# Patient Record
Sex: FEMALE | Race: BLACK OR AFRICAN AMERICAN | NOT HISPANIC OR LATINO | Employment: OTHER | URBAN - METROPOLITAN AREA
[De-identification: names, ages, dates, MRNs, and addresses within clinical notes are randomized per-mention and may not be internally consistent; named-entity substitution may affect disease eponyms.]

---

## 2023-10-22 ENCOUNTER — HOSPITAL ENCOUNTER (EMERGENCY)
Facility: HOSPITAL | Age: 83
Discharge: HOME | End: 2023-10-22
Attending: EMERGENCY MEDICINE
Payer: MEDICARE

## 2023-10-22 VITALS
HEART RATE: 72 BPM | RESPIRATION RATE: 18 BRPM | WEIGHT: 198 LBS | BODY MASS INDEX: 31.08 KG/M2 | DIASTOLIC BLOOD PRESSURE: 74 MMHG | HEIGHT: 67 IN | SYSTOLIC BLOOD PRESSURE: 160 MMHG | TEMPERATURE: 98.3 F | OXYGEN SATURATION: 98 %

## 2023-10-22 DIAGNOSIS — I10 HYPERTENSION: Primary | ICD-10-CM

## 2023-10-22 LAB
ANION GAP SERPL CALCULATED.3IONS-SCNC: 9 MMOL/L
ATRIAL RATE: 65 BPM
BASOPHILS # BLD AUTO: 0.05 THOUSANDS/ÂΜL (ref 0–0.1)
BASOPHILS NFR BLD AUTO: 1 % (ref 0–1)
BUN SERPL-MCNC: 19 MG/DL (ref 5–25)
CALCIUM SERPL-MCNC: 9.6 MG/DL (ref 8.4–10.2)
CHLORIDE SERPL-SCNC: 109 MMOL/L (ref 96–108)
CO2 SERPL-SCNC: 21 MMOL/L (ref 21–32)
CREAT SERPL-MCNC: 1.2 MG/DL (ref 0.6–1.3)
EOSINOPHIL # BLD AUTO: 0.18 THOUSAND/ÂΜL (ref 0–0.61)
EOSINOPHIL NFR BLD AUTO: 2 % (ref 0–6)
ERYTHROCYTE [DISTWIDTH] IN BLOOD BY AUTOMATED COUNT: 17.8 % (ref 11.6–15.1)
GFR SERPL CREATININE-BSD FRML MDRD: 41 ML/MIN/1.73SQ M
GLUCOSE SERPL-MCNC: 96 MG/DL (ref 65–140)
HCT VFR BLD AUTO: 39.2 % (ref 34.8–46.1)
HGB BLD-MCNC: 12.1 G/DL (ref 11.5–15.4)
IMM GRANULOCYTES # BLD AUTO: 0.02 THOUSAND/UL (ref 0–0.2)
IMM GRANULOCYTES NFR BLD AUTO: 0 % (ref 0–2)
LYMPHOCYTES # BLD AUTO: 2.01 THOUSANDS/ÂΜL (ref 0.6–4.47)
LYMPHOCYTES NFR BLD AUTO: 27 % (ref 14–44)
MCH RBC QN AUTO: 25.5 PG (ref 26.8–34.3)
MCHC RBC AUTO-ENTMCNC: 30.9 G/DL (ref 31.4–37.4)
MCV RBC AUTO: 83 FL (ref 82–98)
MONOCYTES # BLD AUTO: 0.51 THOUSAND/ÂΜL (ref 0.17–1.22)
MONOCYTES NFR BLD AUTO: 7 % (ref 4–12)
NEUTROPHILS # BLD AUTO: 4.71 THOUSANDS/ÂΜL (ref 1.85–7.62)
NEUTS SEG NFR BLD AUTO: 63 % (ref 43–75)
NRBC BLD AUTO-RTO: 0 /100 WBCS
P AXIS: 56 DEGREES
PLATELET # BLD AUTO: 171 THOUSANDS/UL (ref 149–390)
PMV BLD AUTO: 12.6 FL (ref 8.9–12.7)
POTASSIUM SERPL-SCNC: 3.9 MMOL/L (ref 3.5–5.3)
PR INTERVAL: 282 MS
QRS AXIS: 264 DEGREES
QRSD INTERVAL: 148 MS
QT INTERVAL: 484 MS
QTC INTERVAL: 503 MS
RBC # BLD AUTO: 4.74 MILLION/UL (ref 3.81–5.12)
SODIUM SERPL-SCNC: 139 MMOL/L (ref 135–147)
T WAVE AXIS: 72 DEGREES
VENTRICULAR RATE: 65 BPM
WBC # BLD AUTO: 7.48 THOUSAND/UL (ref 4.31–10.16)

## 2023-10-22 PROCEDURE — 99284 EMERGENCY DEPT VISIT MOD MDM: CPT | Performed by: EMERGENCY MEDICINE

## 2023-10-22 PROCEDURE — 96376 TX/PRO/DX INJ SAME DRUG ADON: CPT

## 2023-10-22 PROCEDURE — 93005 ELECTROCARDIOGRAM TRACING: CPT

## 2023-10-22 PROCEDURE — 93010 ELECTROCARDIOGRAM REPORT: CPT | Performed by: INTERNAL MEDICINE

## 2023-10-22 PROCEDURE — 99284 EMERGENCY DEPT VISIT MOD MDM: CPT

## 2023-10-22 PROCEDURE — 96374 THER/PROPH/DIAG INJ IV PUSH: CPT

## 2023-10-22 PROCEDURE — 85025 COMPLETE CBC W/AUTO DIFF WBC: CPT | Performed by: EMERGENCY MEDICINE

## 2023-10-22 PROCEDURE — 36415 COLL VENOUS BLD VENIPUNCTURE: CPT | Performed by: EMERGENCY MEDICINE

## 2023-10-22 PROCEDURE — 80048 BASIC METABOLIC PNL TOTAL CA: CPT | Performed by: EMERGENCY MEDICINE

## 2023-10-22 RX ORDER — HYDRALAZINE HYDROCHLORIDE 20 MG/ML
10 INJECTION INTRAMUSCULAR; INTRAVENOUS ONCE
Status: COMPLETED | OUTPATIENT
Start: 2023-10-22 | End: 2023-10-22

## 2023-10-22 RX ORDER — HYDRALAZINE HYDROCHLORIDE 20 MG/ML
5 INJECTION INTRAMUSCULAR; INTRAVENOUS ONCE
Status: COMPLETED | OUTPATIENT
Start: 2023-10-22 | End: 2023-10-22

## 2023-10-22 RX ADMIN — HYDRALAZINE HYDROCHLORIDE 10 MG: 20 INJECTION INTRAMUSCULAR; INTRAVENOUS at 03:46

## 2023-10-22 RX ADMIN — HYDRALAZINE HYDROCHLORIDE 5 MG: 20 INJECTION INTRAMUSCULAR; INTRAVENOUS at 05:27

## 2023-10-22 NOTE — ED PROVIDER NOTES
History  Chief Complaint   Patient presents with    Hypertension     Pt arrives via hospital wheelchair with a c/o HTN (213/98). Pt takes Labetalol, and states that she takes it as prescribed. Denies CP, SOB or HA. 79-year-old female history of hypertension, CHF, CAD, DVT on Xarelto presenting with asymptomatic hypertension. Patient reports that she takes her blood pressure daily and is normally well controlled. Patient reports elevated blood pressure at home today. Denies any symptoms including headache, visual changes, chest pain, shortness of breath. Also denies any neurological changes such as motor or sensory deficits. Denies any other complaints. Reports compliance with home medication. Chart reviewed. Past Medical History:  No date: Anemia  No date: Cardiac disease  No date: Cervical spondylolysis  No date: Cervical stenosis of spine  No date: CHF (congestive heart failure) ([facility])  No date: DVT (deep venous thrombosis) ([facility])  No date: Hypertension  No date: Neuropathy      Comment:  from fall accident  No date: Osteoarthritis  Family History: non-contributory  Social History          Prior to Admission Medications   Prescriptions Last Dose Informant Patient Reported? Taking?    Calcium Carbonate-Vit D-Min (CALCIUM 1200 PO)   Yes No   Sig: Take by mouth   amLODIPine-atorvastatin (CADUET) 10-40 MG per tablet   Yes No   Sig: Take 1 tablet by mouth daily   ferrous sulfate 324 (65 Fe) mg   No No   Sig: Take 1 tablet (324 mg total) by mouth 2 (two) times a day before meals   gabapentin (NEURONTIN) 100 mg capsule   No No   Sig: Take 1 capsule (100 mg total) by mouth daily   labetalol (NORMODYNE) 200 mg tablet   No No   Sig: Take 2 tablets (400 mg total) by mouth every 12 (twelve) hours Take 400 mg in the morning and 200 at night   Patient taking differently: Take 100 mg by mouth every 12 (twelve) hours Take 400 mg in the morning and 200 at night   rivaroxaban (XARELTO) 20 mg tablet   No No   Sig: Take 1 tablet (20 mg total) by mouth daily with breakfast      Facility-Administered Medications: None       Past Medical History:   Diagnosis Date    Anemia     Cardiac disease     Cervical spondylolysis     Cervical stenosis of spine     CHF (congestive heart failure) (HCC)     DVT (deep venous thrombosis) (HCC)     Hypertension     Neuropathy     from fall accident    Osteoarthritis        Past Surgical History:   Procedure Laterality Date    CARDIAC PACEMAKER PLACEMENT         History reviewed. No pertinent family history. I have reviewed and agree with the history as documented. E-Cigarette/Vaping    E-Cigarette Use Never User      E-Cigarette/Vaping Substances    Nicotine No     THC No     CBD No     Flavoring No     Other No     Unknown No      Social History     Tobacco Use    Smoking status: Never    Smokeless tobacco: Never   Vaping Use    Vaping Use: Never used   Substance Use Topics    Alcohol use: No    Drug use: No       Review of Systems   Constitutional:  Negative for appetite change, chills, diaphoresis, fever and unexpected weight change. HENT:  Negative for congestion and rhinorrhea. Eyes:  Negative for photophobia and visual disturbance. Respiratory:  Negative for cough, chest tightness and shortness of breath. Cardiovascular:  Negative for chest pain, palpitations and leg swelling. Gastrointestinal:  Negative for abdominal distention, abdominal pain, blood in stool, constipation, diarrhea, nausea and vomiting. Genitourinary:  Negative for dysuria and hematuria. Musculoskeletal:  Negative for back pain, joint swelling, neck pain and neck stiffness. Skin:  Negative for color change, pallor, rash and wound. Neurological:  Negative for dizziness, syncope, weakness, light-headedness and headaches. Psychiatric/Behavioral:  Negative for agitation. All other systems reviewed and are negative. Physical Exam  Physical Exam  Vitals and nursing note reviewed.    Constitutional: General: She is not in acute distress. Appearance: Normal appearance. She is well-developed. She is not ill-appearing, toxic-appearing or diaphoretic. HENT:      Head: Normocephalic and atraumatic. Nose: Nose normal. No congestion or rhinorrhea. Mouth/Throat:      Mouth: Mucous membranes are moist.      Pharynx: Oropharynx is clear. No oropharyngeal exudate or posterior oropharyngeal erythema. Eyes:      General: No scleral icterus. Right eye: No discharge. Left eye: No discharge. Extraocular Movements: Extraocular movements intact. Conjunctiva/sclera: Conjunctivae normal.      Pupils: Pupils are equal, round, and reactive to light. Neck:      Vascular: No JVD. Trachea: No tracheal deviation. Comments: Supple. Normal range of motion. Cardiovascular:      Rate and Rhythm: Normal rate and regular rhythm. Heart sounds: Normal heart sounds. No murmur heard. No friction rub. No gallop. Comments: Normal rate and regular rhythm  Pulmonary:      Effort: Pulmonary effort is normal. No respiratory distress. Breath sounds: Normal breath sounds. No stridor. No wheezing or rales. Comments: Clear to auscultation bilaterally  Chest:      Chest wall: No tenderness. Abdominal:      General: Bowel sounds are normal. There is no distension. Palpations: Abdomen is soft. Tenderness: There is no abdominal tenderness. There is no right CVA tenderness, left CVA tenderness, guarding or rebound. Comments: Soft, nontender, nondistended. Normal bowel sounds throughout   Musculoskeletal:         General: No swelling, tenderness, deformity or signs of injury. Normal range of motion. Cervical back: Normal range of motion and neck supple. No rigidity. No muscular tenderness. Right lower leg: No edema. Left lower leg: No edema. Lymphadenopathy:      Cervical: No cervical adenopathy. Skin:     General: Skin is warm and dry. Coloration: Skin is not pale. Findings: No erythema or rash. Neurological:      General: No focal deficit present. Mental Status: She is alert. Mental status is at baseline. Sensory: No sensory deficit. Motor: No weakness or abnormal muscle tone. Coordination: Coordination normal.      Gait: Gait normal.      Comments: Alert. Strength and sensation grossly intact. Ambulatory without difficulty at baseline. Psychiatric:         Behavior: Behavior normal.         Thought Content:  Thought content normal.         Vital Signs  ED Triage Vitals [10/22/23 0013]   Temperature Pulse Respirations Blood Pressure SpO2   98.3 °F (36.8 °C) 64 18 (!) 219/91 95 %      Temp Source Heart Rate Source Patient Position - Orthostatic VS BP Location FiO2 (%)   Oral Monitor Sitting Left arm --      Pain Score       --           Vitals:    10/22/23 0013 10/22/23 0430   BP: (!) 219/91 (!) 174/79   Pulse: 64 68   Patient Position - Orthostatic VS: Sitting          Visual Acuity      ED Medications  Medications   hydrALAZINE (APRESOLINE) injection 10 mg (10 mg Intravenous Given 10/22/23 0346)   hydrALAZINE (APRESOLINE) injection 5 mg (5 mg Intravenous Given 10/22/23 0527)       Diagnostic Studies  Results Reviewed       Procedure Component Value Units Date/Time    Basic metabolic panel [[id number]]  (Abnormal) Collected: 10/22/23 0346    Lab Status: Final result Specimen: Blood from Hand, Right Updated: 10/22/23 0408     Sodium 139 mmol/L      Potassium 3.9 mmol/L      Chloride 109 mmol/L      CO2 21 mmol/L      ANION GAP 9 mmol/L      BUN 19 mg/dL      Creatinine 1.20 mg/dL      Glucose 96 mg/dL      Calcium 9.6 mg/dL      eGFR 41 ml/min/1.73sq m     Narrative:      [place] guidelines for Chronic Kidney Disease (CKD):     Stage 1 with normal or high GFR (GFR > 90 mL/min/1.73 square meters)    Stage 2 Mild CKD (GFR = 60-89 mL/min/1.73 square meters)    Stage 3A Moderate CKD (GFR = 45-59 mL/min/1.73 square meters)    Stage 3B Moderate CKD (GFR = 30-44 mL/min/1.73 square meters)    Stage 4 Severe CKD (GFR = 15-29 mL/min/1.73 square meters)    Stage 5 End Stage CKD (GFR <15 mL/min/1.73 square meters)  Note: GFR calculation is accurate only with a steady state creatinine    CBC and differential [[id number]]  (Abnormal) Collected: 10/22/23 0346    Lab Status: Final result Specimen: Blood from Hand, Right Updated: 10/22/23 0350     WBC 7.48 Thousand/uL      RBC 4.74 Million/uL      Hemoglobin 12.1 g/dL      Hematocrit 39.2 %      MCV 83 fL      MCH 25.5 pg      MCHC 30.9 g/dL      RDW 17.8 %      MPV 12.6 fL      Platelets 538 Thousands/uL      nRBC 0 /100 WBCs      Neutrophils Relative 63 %      Immat GRANS % 0 %      Lymphocytes Relative 27 %      Monocytes Relative 7 %      Eosinophils Relative 2 %      Basophils Relative 1 %      Neutrophils Absolute 4.71 Thousands/µL      Immature Grans Absolute 0.02 Thousand/uL      Lymphocytes Absolute 2.01 Thousands/µL      Monocytes Absolute 0.51 Thousand/µL      Eosinophils Absolute 0.18 Thousand/µL      Basophils Absolute 0.05 Thousands/µL                    No orders to display              Procedures  Procedures         ED Course                                             Medical Decision Making  24-year-old female history of hypertension, CHF, CAD, DVT on Xarelto presenting with asymptomatic hypertension. Plan for EKG and basic labs. Will give dose of IV blood pressure medication. Reassess. EKG interpreted by me with normal sinus rhythm and no acute ST abnormality. Labs interpreted by me without significant acute process. Blood pressure improving with medications. Blood pressure medication recommendations. Discussed results and recommendations. Advised follow up PCP. Medication recommendations. Given instructions and return precautions.  Patient/family at bedside acknowledged understanding of all written and verbal instructions and return precautions. Discharged. Amount and/or Complexity of Data Reviewed  Labs: ordered. Risk  Prescription drug management. Disposition  Final diagnoses:   Hypertension     Time reflects when diagnosis was documented in both MDM as applicable and the Disposition within this note       Time User Action Codes Description Comment    10/22/2023  3:52 AM KevinRachel dunham [facility] Hypertension           ED Disposition       ED Disposition   Discharge    Condition   Stable    Date/Time   Sun Oct 22, 2023  4:43 AM    Comment   Jeanne Suh discharge to home/self care. Follow-up Information       Follow up With Specialties Details Why [address] Manzano MD Family Medicine Schedule an appointment as soon as possible for a visit in 1 week  [facility]. [address]  [facility] [address] [phone]              Patient's Medications   Discharge Prescriptions    No medications on file       No discharge procedures on file.     PDMP Review       None            ED Provider  Electronically Signed by             Maggie Pickering MD  10/22/23 1553

## 2025-05-09 ENCOUNTER — APPOINTMENT (INPATIENT)
Dept: RADIOLOGY | Facility: HOSPITAL | Age: 85
DRG: 392 | End: 2025-05-09
Payer: COMMERCIAL

## 2025-05-09 ENCOUNTER — HOSPITAL ENCOUNTER (INPATIENT)
Facility: HOSPITAL | Age: 85
LOS: 1 days | Discharge: HOME | DRG: 392 | End: 2025-05-10
Attending: EMERGENCY MEDICINE | Admitting: STUDENT IN AN ORGANIZED HEALTH CARE EDUCATION/TRAINING PROGRAM
Payer: COMMERCIAL

## 2025-05-09 DIAGNOSIS — R13.10 DIFFICULTY SWALLOWING: ICD-10-CM

## 2025-05-09 DIAGNOSIS — I10 HYPERTENSION: ICD-10-CM

## 2025-05-09 DIAGNOSIS — I15.9 SECONDARY HYPERTENSION: ICD-10-CM

## 2025-05-09 DIAGNOSIS — R11.10 VOMITING: Primary | ICD-10-CM

## 2025-05-09 LAB
ANION GAP SERPL CALCULATED.3IONS-SCNC: 8 MMOL/L (ref 4–13)
BASOPHILS # BLD AUTO: 0.01 THOUSANDS/ÂΜL (ref 0–0.1)
BASOPHILS NFR BLD AUTO: 0 % (ref 0–1)
BUN SERPL-MCNC: 13 MG/DL (ref 5–25)
CALCIUM SERPL-MCNC: 9.4 MG/DL (ref 8.4–10.2)
CHLORIDE SERPL-SCNC: 107 MMOL/L (ref 96–108)
CO2 SERPL-SCNC: 27 MMOL/L (ref 21–32)
CREAT SERPL-MCNC: 0.98 MG/DL (ref 0.6–1.3)
EOSINOPHIL # BLD AUTO: 0.14 THOUSAND/ÂΜL (ref 0–0.61)
EOSINOPHIL NFR BLD AUTO: 1 % (ref 0–6)
ERYTHROCYTE [DISTWIDTH] IN BLOOD BY AUTOMATED COUNT: 18.1 % (ref 11.6–15.1)
GFR SERPL CREATININE-BSD FRML MDRD: 52 ML/MIN/1.73SQ M
GLUCOSE SERPL-MCNC: 139 MG/DL (ref 65–140)
HCT VFR BLD AUTO: 31.8 % (ref 34.8–46.1)
HGB BLD-MCNC: 9.8 G/DL (ref 11.5–15.4)
IMM GRANULOCYTES # BLD AUTO: 0.05 THOUSAND/UL (ref 0–0.2)
IMM GRANULOCYTES NFR BLD AUTO: 0 % (ref 0–2)
LYMPHOCYTES # BLD AUTO: 1.16 THOUSANDS/ÂΜL (ref 0.6–4.47)
LYMPHOCYTES NFR BLD AUTO: 10 % (ref 14–44)
MCH RBC QN AUTO: 24.6 PG (ref 26.8–34.3)
MCHC RBC AUTO-ENTMCNC: 30.8 G/DL (ref 31.4–37.4)
MCV RBC AUTO: 80 FL (ref 82–98)
MONOCYTES # BLD AUTO: 0.35 THOUSAND/ÂΜL (ref 0.17–1.22)
MONOCYTES NFR BLD AUTO: 3 % (ref 4–12)
NEUTROPHILS # BLD AUTO: 10.44 THOUSANDS/ÂΜL (ref 1.85–7.62)
NEUTS SEG NFR BLD AUTO: 86 % (ref 43–75)
NRBC BLD AUTO-RTO: 0 /100 WBCS
PLATELET # BLD AUTO: 188 THOUSANDS/UL (ref 149–390)
PMV BLD AUTO: 10.6 FL (ref 8.9–12.7)
POTASSIUM SERPL-SCNC: 3.6 MMOL/L (ref 3.5–5.3)
RBC # BLD AUTO: 3.99 MILLION/UL (ref 3.81–5.12)
SODIUM SERPL-SCNC: 142 MMOL/L (ref 135–147)
WBC # BLD AUTO: 12.15 THOUSAND/UL (ref 4.31–10.16)

## 2025-05-09 PROCEDURE — 99223 1ST HOSP IP/OBS HIGH 75: CPT | Performed by: STUDENT IN AN ORGANIZED HEALTH CARE EDUCATION/TRAINING PROGRAM

## 2025-05-09 PROCEDURE — 96361 HYDRATE IV INFUSION ADD-ON: CPT

## 2025-05-09 PROCEDURE — 80048 BASIC METABOLIC PNL TOTAL CA: CPT | Performed by: EMERGENCY MEDICINE

## 2025-05-09 PROCEDURE — 82728 ASSAY OF FERRITIN: CPT | Performed by: STUDENT IN AN ORGANIZED HEALTH CARE EDUCATION/TRAINING PROGRAM

## 2025-05-09 PROCEDURE — 99284 EMERGENCY DEPT VISIT MOD MDM: CPT

## 2025-05-09 PROCEDURE — 83550 IRON BINDING TEST: CPT | Performed by: STUDENT IN AN ORGANIZED HEALTH CARE EDUCATION/TRAINING PROGRAM

## 2025-05-09 PROCEDURE — 96374 THER/PROPH/DIAG INJ IV PUSH: CPT

## 2025-05-09 PROCEDURE — 71045 X-RAY EXAM CHEST 1 VIEW: CPT

## 2025-05-09 PROCEDURE — 99285 EMERGENCY DEPT VISIT HI MDM: CPT | Performed by: EMERGENCY MEDICINE

## 2025-05-09 PROCEDURE — 85025 COMPLETE CBC W/AUTO DIFF WBC: CPT | Performed by: EMERGENCY MEDICINE

## 2025-05-09 PROCEDURE — 83540 ASSAY OF IRON: CPT | Performed by: STUDENT IN AN ORGANIZED HEALTH CARE EDUCATION/TRAINING PROGRAM

## 2025-05-09 PROCEDURE — 36415 COLL VENOUS BLD VENIPUNCTURE: CPT | Performed by: EMERGENCY MEDICINE

## 2025-05-09 RX ORDER — METOPROLOL TARTRATE 1 MG/ML
2.5 INJECTION, SOLUTION INTRAVENOUS EVERY 6 HOURS PRN
Status: DISCONTINUED | OUTPATIENT
Start: 2025-05-09 | End: 2025-05-10 | Stop reason: HOSPADM

## 2025-05-09 RX ORDER — AMLODIPINE BESYLATE 10 MG/1
10 TABLET ORAL DAILY
Status: DISCONTINUED | OUTPATIENT
Start: 2025-05-10 | End: 2025-05-10 | Stop reason: HOSPADM

## 2025-05-09 RX ORDER — ATORVASTATIN CALCIUM 40 MG/1
40 TABLET, FILM COATED ORAL DAILY
Status: DISCONTINUED | OUTPATIENT
Start: 2025-05-10 | End: 2025-05-10 | Stop reason: HOSPADM

## 2025-05-09 RX ORDER — LABETALOL 100 MG/1
100 TABLET, FILM COATED ORAL EVERY 12 HOURS SCHEDULED
Status: DISCONTINUED | OUTPATIENT
Start: 2025-05-09 | End: 2025-05-10 | Stop reason: HOSPADM

## 2025-05-09 RX ORDER — PANTOPRAZOLE SODIUM 40 MG/10ML
40 INJECTION, POWDER, LYOPHILIZED, FOR SOLUTION INTRAVENOUS ONCE
Status: COMPLETED | OUTPATIENT
Start: 2025-05-09 | End: 2025-05-09

## 2025-05-09 RX ADMIN — SODIUM CHLORIDE 1000 ML: 0.9 INJECTION, SOLUTION INTRAVENOUS at 18:05

## 2025-05-09 RX ADMIN — PANTOPRAZOLE SODIUM 40 MG: 40 INJECTION, POWDER, FOR SOLUTION INTRAVENOUS at 18:05

## 2025-05-09 RX ADMIN — LABETALOL HYDROCHLORIDE 100 MG: 100 TABLET, FILM COATED ORAL at 21:18

## 2025-05-09 NOTE — H&P
H&P - Hospitalist   Name: Fanta Bishop 84 y.o. female I MRN: [id number]  Unit/Bed#: -01 I Date of Admission: 5/9/2025   Date of Service: 5/9/2025 I Hospital Day: 0     Assessment & Plan  Difficulty swallowing  Sudden onset difficulty swallowing solids and liquids after having breakfast and pills, doesn't think anything got stuck in there  ED reached out to GI, plan for EGD tomm   Will get cxr check for foreign body   Clears for now if she tolerates   Ekg ordered   Neuropathy  Hold gabapentin ?sidefx of dysphagia  Hypertension  Missed pills this morning bc couldn't swallow, Cont home meds- will add iv prn if not tolerating swallow   Iron deficiency anemia  Cont home meds, pt does not want biopsy for risk of bleeding as she refuses blood products       VTE Pharmacologic Prophylaxis:   Moderate Risk (Score 3-4) - Pharmacological DVT Prophylaxis Ordered: rivaroxaban (Xarelto).  Code Status: Level 1 - Full Code       Anticipated Length of Stay: Patient will be admitted on an inpatient basis with an anticipated length of stay of greater than 2 midnights secondary to GI eval rashad garcia EGD.    History of Present Illness   Chief Complaint: difficulty swallowing    Fanta Bishop is a 84 y.o. female with a PMH of with sudden onset difficutly swallowing after having breakfast and trying to take her morning pills. She states she tried to drink water but it came right back up but thicker almost like saliva. Has not attempted to eat again after that. This has never happened before.     Review of Systems   All other systems reviewed and are negative.      Historical Information   Past Medical History:   Diagnosis Date    Anemia     Cardiac disease     Cervical spondylolysis     Cervical stenosis of spine     CHF (congestive heart failure) ([facility])     DVT (deep venous thrombosis) ([facility])     Hypertension     Neuropathy     from fall accident    Osteoarthritis      Past Surgical History:   Procedure Laterality Date    CARDIAC  "PACEMAKER PLACEMENT       Social History     Tobacco Use    Smoking status: Never    Smokeless tobacco: Never   Vaping Use    Vaping status: Never Used   Substance and Sexual Activity    Alcohol use: No    Drug use: No    Sexual activity: Not on file     E-Cigarette/Vaping    E-Cigarette Use Never User      E-Cigarette/Vaping Substances    Nicotine No     THC No     CBD No     Flavoring No     Other No     Unknown No        Social History:  Marital Status:        Meds/Allergies   I have reviewed home medications with patient personally.  Prior to Admission medications    Medication Sig Start Date End Date Taking? Authorizing Provider   amLODIPine-atorvastatin (CADUET) 10-40 MG per tablet Take 1 tablet by mouth daily    Historical Provider, MD   Calcium Carbonate-Vit D-Min (CALCIUM 1200 PO) Take by mouth    Historical Provider, MD   ferrous sulfate 324 (65 Fe) mg Take 1 tablet (324 mg total) by mouth 2 (two) times a day before meals 1/11/19   Andres Ortiz MD   gabapentin (NEURONTIN) 100 mg capsule Take 1 capsule (100 mg total) by mouth daily 1/11/19   Andres Ortiz MD   labetalol (NORMODYNE) 200 mg tablet Take 2 tablets (400 mg total) by mouth every 12 (twelve) hours Take 400 mg in the morning and 200 at night  Patient taking differently: Take 100 mg by mouth every 12 (twelve) hours Take 400 mg in the morning and 200 at night 1/11/19   Andres Ortiz MD   rivaroxaban (XARELTO) 20 mg tablet Take 1 tablet (20 mg total) by mouth daily with breakfast 1/11/19   Andres Ortiz MD     Allergies   Allergen Reactions    Codeine      \"made me pass out\"       Objective :  Temp:  [97.5 °F (36.4 °C)-98.4 °F (36.9 °C)] 97.5 °F (36.4 °C)  HR:  [70-78] 78  BP: (175-199)/(93-95) 175/93  Resp:  [17-18] 17  SpO2:  [91 %-98 %] 95 %  O2 Device: None (Room air)    Physical Exam  Constitutional:       Appearance: Normal appearance.   HENT:      Mouth/Throat:      Pharynx: Oropharynx is clear.   Cardiovascular:     "  Rate and Rhythm: Normal rate and regular rhythm.   Abdominal:      General: There is no distension.      Tenderness: There is no abdominal tenderness.   Skin:     General: Skin is warm and dry.   Neurological:      General: No focal deficit present.      Mental Status: She is alert.             Lab Results: I have reviewed the following results:  Results from last 7 days   Lab Units 05/09/25  1822   WBC Thousand/uL 12.15*   HEMOGLOBIN g/dL 9.8*   HEMATOCRIT % 31.8*   PLATELETS Thousands/uL 188   SEGS PCT % 86*   LYMPHO PCT % 10*   MONO PCT % 3*   EOS PCT % 1     Results from last 7 days   Lab Units 05/09/25  1822   SODIUM mmol/L 142   POTASSIUM mmol/L 3.6   CHLORIDE mmol/L 107   CO2 mmol/L 27   BUN mg/dL 13   CREATININE mg/dL 0.98   ANION GAP mmol/L 8   CALCIUM mg/dL 9.4   GLUCOSE RANDOM mg/dL 139             Lab Results   Component Value Date    HGBA1C 5.4 09/01/2020           Imaging Results Review: No pertinent imaging studies reviewed.  Other Study Results Review: No additional pertinent studies reviewed.    Administrative Statements       ** Please Note: This note has been constructed using a voice recognition system. **

## 2025-05-09 NOTE — ED PROVIDER NOTES
Time reflects when diagnosis was documented in both MDM as applicable and the Disposition within this note       Time User Action Codes Description Comment    5/9/2025  5:53 PM Hever Washington Add [R11.10] Vomiting           ED Disposition       ED Disposition   Admit    Condition   Stable    Date/Time   Fri May 9, 2025  7:13 PM    Comment   Case was discussed with Dr Lopez and the patient's admission status was agreed to be Admission Status: inpatient status to the service of Dr. Ortiz .               Assessment & Plan       Medical Decision Making  Problems Addressed:  Vomiting: acute illness or injury     Details: Ddx includes esophageal food bolus, esophagitis, electrolyte abnormality, etc.     Amount and/or Complexity of Data Reviewed  Labs: ordered.    Risk  Prescription drug management.  Decision regarding hospitalization.             Medications   glucagon (FOR EMS ONLY) (GLUCAGEN) injection 1 mg (0 mg Does not apply Given to EMS 5/9/25 3864)   sodium chloride 0.9 % bolus 1,000 mL (1,000 mL Intravenous New Bag 5/9/25 1805)   pantoprazole (PROTONIX) injection 40 mg (40 mg Intravenous Given 5/9/25 1805)       ED Risk Strat Scores                    No data recorded                            History of Present Illness       Chief Complaint   Patient presents with    Medical Problem     Pt arrives ems from home, states taking her pills this morning around 10 am and felt pills get stuck and has been feeling this ever since, has been drinking water with minimal relief, ems administered 1mg iv glucagon with minimal relief        Past Medical History:   Diagnosis Date    Anemia     Cardiac disease     Cervical spondylolysis     Cervical stenosis of spine     CHF (congestive heart failure) (HCC)     DVT (deep venous thrombosis) (HCC)     Hypertension     Neuropathy     from fall accident    Osteoarthritis       Past Surgical History:   Procedure Laterality Date    CARDIAC PACEMAKER PLACEMENT        History  "reviewed. No pertinent family history.   Social History     Tobacco Use    Smoking status: Never    Smokeless tobacco: Never   Vaping Use    Vaping status: Never Used   Substance Use Topics    Alcohol use: No    Drug use: No      E-Cigarette/Vaping    E-Cigarette Use Never User       E-Cigarette/Vaping Substances    Nicotine No     THC No     CBD No     Flavoring No     Other No     Unknown No       I have reviewed and agree with the history as documented.     83 yo female with vomiting, began today after taking her morning pills. Initially she stated onset at that time, then stated it may have started after eating lunch. She states she was unable to tolerate liquids without vomiting. EMS gave glucagon. At time of arrival to ED she has no specific complaints. She denies cp, cough, fever, abd pain, back pain. She states in the past she was told to chew her food more completely but she thinks this was due to some \"abdominal\" issue.         Review of Systems   Gastrointestinal:  Positive for vomiting.           Objective       ED Triage Vitals [05/09/25 1731]   Temperature Pulse Blood Pressure Respirations SpO2 Patient Position - Orthostatic VS   98.4 °F (36.9 °C) 74 (!) 199/95 18 91 % Lying      Temp Source Heart Rate Source BP Location FiO2 (%) Pain Score    Oral Monitor Right arm -- --      Vitals      Date and Time Temp Pulse SpO2 Resp BP Pain Score FACES Pain Rating User   05/09/25 1731 98.4 °F (36.9 °C) 74 91 % 18 199/95 -- -- TE            Physical Exam  Vitals and nursing note reviewed.   Constitutional:       General: She is not in acute distress.     Appearance: Normal appearance. She is well-developed. She is not ill-appearing, toxic-appearing or diaphoretic.   HENT:      Head: Normocephalic and atraumatic.      Mouth/Throat:      Mouth: Mucous membranes are moist.      Pharynx: Oropharynx is clear.   Eyes:      Conjunctiva/sclera: Conjunctivae normal.      Pupils: Pupils are equal, round, and reactive to " light.   Neck:      Vascular: No JVD.   Cardiovascular:      Rate and Rhythm: Normal rate and regular rhythm.      Pulses: Normal pulses.      Heart sounds: Normal heart sounds.   Pulmonary:      Effort: Pulmonary effort is normal. No respiratory distress.      Breath sounds: Normal breath sounds. No stridor.   Abdominal:      General: There is no distension.      Palpations: Abdomen is soft.      Tenderness: There is no abdominal tenderness. There is no guarding or rebound.   Musculoskeletal:         General: No tenderness or deformity. Normal range of motion.      Cervical back: Normal range of motion and neck supple. No rigidity.   Skin:     General: Skin is warm and dry.      Capillary Refill: Capillary refill takes less than 2 seconds.      Coloration: Skin is not jaundiced or pale.      Findings: No bruising, erythema or rash.   Neurological:      General: No focal deficit present.      Mental Status: She is alert and oriented to person, place, and time.      Cranial Nerves: No cranial nerve deficit.      Sensory: No sensory deficit.      Motor: No weakness or abnormal muscle tone.      Coordination: Coordination normal.      Gait: Gait normal.         Results Reviewed       Procedure Component Value Units Date/Time    Basic metabolic panel [[id number]] Collected: 05/09/25 1822    Lab Status: Final result Specimen: Blood from Arm, Right Updated: 05/09/25 1906     Sodium 142 mmol/L      Potassium 3.6 mmol/L      Chloride 107 mmol/L      CO2 27 mmol/L      ANION GAP 8 mmol/L      BUN 13 mg/dL      Creatinine 0.98 mg/dL      Glucose 139 mg/dL      Calcium 9.4 mg/dL      eGFR 52 ml/min/1.73sq m     Narrative:      National Kid[employer] Foundation guidelines for Chronic Kidney Disease (CKD):     Stage 1 with normal or high GFR (GFR > 90 mL/min/1.73 square meters)    Stage 2 Mild CKD (GFR = 60-89 mL/min/1.73 square meters)    Stage 3A Moderate CKD (GFR = 45-59 mL/min/1.73 square meters)    Stage 3B Moderate CKD  (GFR = 30-44 mL/min/1.73 square meters)    Stage 4 Severe CKD (GFR = 15-29 mL/min/1.73 square meters)    Stage 5 End Stage CKD (GFR <15 mL/min/1.73 square meters)  Note: GFR calculation is accurate only with a steady state creatinine    CBC and differential [[id number]]  (Abnormal) Collected: 05/09/25 1822    Lab Status: Final result Specimen: Blood from Arm, Right Updated: 05/09/25 1841     WBC 12.15 Thousand/uL      RBC 3.99 Million/uL      Hemoglobin 9.8 g/dL      Hematocrit 31.8 %      MCV 80 fL      MCH 24.6 pg      MCHC 30.8 g/dL      RDW 18.1 %      MPV 10.6 fL      Platelets 188 Thousands/uL      nRBC 0 /100 WBCs      Segmented % 86 %      Immature Grans % 0 %      Lymphocytes % 10 %      Monocytes % 3 %      Eosinophils Relative 1 %      Basophils Relative 0 %      Absolute Neutrophils 10.44 Thousands/µL      Absolute Immature Grans 0.05 Thousand/uL      Absolute Lymphocytes 1.16 Thousands/µL      Absolute Monocytes 0.35 Thousand/µL      Eosinophils Absolute 0.14 Thousand/µL      Basophils Absolute 0.01 Thousands/µL             No orders to display       Procedures    ED Medication and Procedure Management   Prior to Admission Medications   Prescriptions Last Dose Informant Patient Reported? Taking?   Calcium Carbonate-Vit D-Min (CALCIUM 1200 PO)   Yes No   Sig: Take by mouth   amLODIPine-atorvastatin (CADUET) 10-40 MG per tablet   Yes No   Sig: Take 1 tablet by mouth daily   ferrous sulfate 324 (65 Fe) mg   No No   Sig: Take 1 tablet (324 mg total) by mouth 2 (two) times a day before meals   gabapentin (NEURONTIN) 100 mg capsule   No No   Sig: Take 1 capsule (100 mg total) by mouth daily   labetalol (NORMODYNE) 200 mg tablet   No No   Sig: Take 2 tablets (400 mg total) by mouth every 12 (twelve) hours Take 400 mg in the morning and 200 at night   Patient taking differently: Take 100 mg by mouth every 12 (twelve) hours Take 400 mg in the morning and 200 at night   rivaroxaban (XARELTO) 20 mg tablet   No No    Sig: Take 1 tablet (20 mg total) by mouth daily with breakfast      Facility-Administered Medications: None     Patient's Medications   Discharge Prescriptions    No medications on file     No discharge procedures on file.  ED SEPSIS DOCUMENTATION   Time reflects when diagnosis was documented in both MDM as applicable and the Disposition within this note       Time User Action Codes Description Comment    5/9/2025  5:53 PM Hever Washington Add [R11.10] Vomiting                  Hever Washington MD  05/09/25 1108

## 2025-05-10 VITALS
RESPIRATION RATE: 16 BRPM | SYSTOLIC BLOOD PRESSURE: 170 MMHG | HEART RATE: 74 BPM | TEMPERATURE: 98.5 F | BODY MASS INDEX: 30.13 KG/M2 | OXYGEN SATURATION: 96 % | DIASTOLIC BLOOD PRESSURE: 90 MMHG | WEIGHT: 192 LBS | HEIGHT: 67 IN

## 2025-05-10 LAB
ANION GAP SERPL CALCULATED.3IONS-SCNC: 8 MMOL/L (ref 4–13)
BASOPHILS # BLD AUTO: 0.02 THOUSANDS/ÂΜL (ref 0–0.1)
BASOPHILS NFR BLD AUTO: 0 % (ref 0–1)
BUN SERPL-MCNC: 13 MG/DL (ref 5–25)
CALCIUM SERPL-MCNC: 9.6 MG/DL (ref 8.4–10.2)
CHLORIDE SERPL-SCNC: 105 MMOL/L (ref 96–108)
CO2 SERPL-SCNC: 27 MMOL/L (ref 21–32)
CREAT SERPL-MCNC: 0.92 MG/DL (ref 0.6–1.3)
EOSINOPHIL # BLD AUTO: 0.19 THOUSAND/ÂΜL (ref 0–0.61)
EOSINOPHIL NFR BLD AUTO: 2 % (ref 0–6)
ERYTHROCYTE [DISTWIDTH] IN BLOOD BY AUTOMATED COUNT: 18.1 % (ref 11.6–15.1)
FERRITIN SERPL-MCNC: 328 NG/ML (ref 30–307)
GFR SERPL CREATININE-BSD FRML MDRD: 56 ML/MIN/1.73SQ M
GLUCOSE SERPL-MCNC: 89 MG/DL (ref 65–140)
HCT VFR BLD AUTO: 31.8 % (ref 34.8–46.1)
HGB BLD-MCNC: 9.8 G/DL (ref 11.5–15.4)
IMM GRANULOCYTES # BLD AUTO: 0.03 THOUSAND/UL (ref 0–0.2)
IMM GRANULOCYTES NFR BLD AUTO: 0 % (ref 0–2)
IRON SATN MFR SERPL: 10 % (ref 15–50)
IRON SERPL-MCNC: 29 UG/DL (ref 50–212)
LYMPHOCYTES # BLD AUTO: 1.18 THOUSANDS/ÂΜL (ref 0.6–4.47)
LYMPHOCYTES NFR BLD AUTO: 12 % (ref 14–44)
MCH RBC QN AUTO: 24.4 PG (ref 26.8–34.3)
MCHC RBC AUTO-ENTMCNC: 30.8 G/DL (ref 31.4–37.4)
MCV RBC AUTO: 79 FL (ref 82–98)
MONOCYTES # BLD AUTO: 0.58 THOUSAND/ÂΜL (ref 0.17–1.22)
MONOCYTES NFR BLD AUTO: 6 % (ref 4–12)
NEUTROPHILS # BLD AUTO: 7.97 THOUSANDS/ÂΜL (ref 1.85–7.62)
NEUTS SEG NFR BLD AUTO: 80 % (ref 43–75)
NRBC BLD AUTO-RTO: 0 /100 WBCS
PLATELET # BLD AUTO: 193 THOUSANDS/UL (ref 149–390)
PMV BLD AUTO: 10.9 FL (ref 8.9–12.7)
POTASSIUM SERPL-SCNC: 4 MMOL/L (ref 3.5–5.3)
RBC # BLD AUTO: 4.01 MILLION/UL (ref 3.81–5.12)
SODIUM SERPL-SCNC: 140 MMOL/L (ref 135–147)
TIBC SERPL-MCNC: 289.8 UG/DL (ref 250–450)
TRANSFERRIN SERPL-MCNC: 207 MG/DL (ref 203–362)
UIBC SERPL-MCNC: 261 UG/DL (ref 155–355)
WBC # BLD AUTO: 9.97 THOUSAND/UL (ref 4.31–10.16)

## 2025-05-10 PROCEDURE — 99222 1ST HOSP IP/OBS MODERATE 55: CPT | Performed by: INTERNAL MEDICINE

## 2025-05-10 PROCEDURE — 80048 BASIC METABOLIC PNL TOTAL CA: CPT | Performed by: STUDENT IN AN ORGANIZED HEALTH CARE EDUCATION/TRAINING PROGRAM

## 2025-05-10 PROCEDURE — 85025 COMPLETE CBC W/AUTO DIFF WBC: CPT | Performed by: STUDENT IN AN ORGANIZED HEALTH CARE EDUCATION/TRAINING PROGRAM

## 2025-05-10 PROCEDURE — 99239 HOSP IP/OBS DSCHRG MGMT >30: CPT | Performed by: NURSE PRACTITIONER

## 2025-05-10 RX ORDER — LABETALOL 100 MG/1
200 TABLET, FILM COATED ORAL EVERY 12 HOURS SCHEDULED
Start: 2025-05-10

## 2025-05-10 RX ADMIN — LABETALOL HYDROCHLORIDE 100 MG: 100 TABLET, FILM COATED ORAL at 08:21

## 2025-05-10 RX ADMIN — METOROPROLOL TARTRATE 2.5 MG: 5 INJECTION, SOLUTION INTRAVENOUS at 00:38

## 2025-05-10 RX ADMIN — RIVAROXABAN 20 MG: 20 TABLET, FILM COATED ORAL at 08:21

## 2025-05-10 RX ADMIN — ATORVASTATIN CALCIUM 40 MG: 40 TABLET, FILM COATED ORAL at 08:21

## 2025-05-10 RX ADMIN — AMLODIPINE BESYLATE 10 MG: 10 TABLET ORAL at 08:21

## 2025-05-10 NOTE — ASSESSMENT & PLAN NOTE
Missed pills this morning bc couldn't swallow, Cont home meds- will add iv prn if not tolerating swallow

## 2025-05-10 NOTE — ASSESSMENT & PLAN NOTE
Cont home meds, pt does not want biopsy for risk of bleeding as she refuses blood products   Hemoglobin has remained stable  Patient does not take iron supplement

## 2025-05-10 NOTE — ASSESSMENT & PLAN NOTE
Patient did not take any home medications yesterday due to swallowing difficulties   For all swallowing difficulties improved patient able to take her medications resume home regimen upon discharge

## 2025-05-10 NOTE — DISCHARGE SUMMARY
[facility]  Discharge Summary  Name: Fanta Bishop I MRN: [id number]  Unit/Bed#: ZENY Date of Admission: 5/9/2025   Date of Service: 5/9/2025  I Hospital Day: 1    * Difficulty swallowing  Assessment & Plan  Sudden onset difficulty swallowing solids and liquids after having breakfast and pills, doesn't think anything got stuck in there  ED reached out to GI, initial plan for EGD  Chest xray obtained image reviewed no direct visualization of object seen will trend for final reading from radiology   GI evaluated swallowing difficulties appear to be improved  At this time defer EGD to outpatient  Attempted diet with patient able to swallow cleared from GI perspective and will follow-up with her outpatient  Medically cleared for discharge    Iron deficiency anemia  Assessment & Plan  Cont home meds, pt does not want biopsy for risk of bleeding as she refuses blood products   Hemoglobin has remained stable  Patient does not take iron supplement    Hypertension  Assessment & Plan  Patient did not take any home medications yesterday due to swallowing difficulties   For all swallowing difficulties improved patient able to take her medications resume home regimen upon discharge    Neuropathy  Assessment & Plan  Gabapentin held on admission due to possible concern of side effect of dysphagia   Resume gabapentin follow-up with GI outpatient        Medical Problems       Resolved Problems  Date Reviewed: 8/29/2019   None       Discharging Physician / Practitioner: MAITE Matson  PCP: Valerie Villanueva MD  Admission Date:   Admission Orders (From admission, onward)       Ordered        05/09/25 1913  INPATIENT ADMISSION  Once                          Discharge Date: 05/10/25    Consultations During Hospital Stay:  IP CONSULT TO GASTROENTEROLOGY    Procedures Performed:   none    Significant Findings / Test Results:   No results found.  none    Incidental Findings:   none       Test Results Pending at  "Discharge (will require follow up):   none     Outpatient Tests Requested:  EGD     Complications:  none    Reason for Admission: Swallowing difficulties    Hospital Course:   Fanta Bishop is a 84 y.o. female patient with  has a past medical history of Anemia, Cardiac disease, Cervical spondylolysis, Cervical stenosis of spine, CHF (congestive heart failure) ([facility]), DVT (deep venous thrombosis) ([facility]), Hypertension, Neuropathy, and Osteoarthritis. who originally presented to the hospital on 5/9/2025 due to Medical Problem (Pt arrives ems from home, states taking her pills this morning around 10 am and felt pills get stuck and has been feeling this ever since, has been drinking water with minimal relief, ems administered 1mg iv glucagon with minimal relief ).  Patient admitted kept n.p.o. given IV fluids overall with rest evaluated by GI the following morning given patient reported can take small steps without difficulty EGD was deferred diet was attempted and patient was successfully able to eat without difficulty recommended to follow-up with her team outpatient would get her scheduled for an EGD and she was stable for discharge.  Son and patient agreed with this plan overall medically cleared        Please see above list of diagnoses and related plan for additional information.   Given overall medically improved of symptoms patient was discharged earlier than anticipated.    Condition at Discharge: good    Discharge Day Visit / Exam:   Subjective:    Patient reports she overall feels better was able to tolerate her diet swallow pills without difficulty would prefer to defer EGD to outpatient along with the son he agrees with this plan patient has no other complaints and desired to discharge home.  Vitals: Blood Pressure: 170/90 (05/10/25 0622)  Pulse: 74 (05/10/25 0622)  Temperature: 98.5 °F (36.9 °C) (05/10/25 0622)  Temp Source: Oral (05/10/25 0622)  Respirations: 16 (05/10/25 0622)  Height: 5' 7\" (170.2 cm) " (05/10/25 0749)  Weight - Scale: 87.1 kg (192 lb) (05/10/25 0749)  SpO2: 96 % (05/10/25 0749)  Exam:   Physical Exam  Vitals and nursing note reviewed.   Constitutional:       General: She is not in acute distress.     Appearance: She is well-developed.   HENT:      Head: Normocephalic and atraumatic.   Eyes:      Conjunctiva/sclera: Conjunctivae normal.   Cardiovascular:      Rate and Rhythm: Normal rate and regular rhythm.      Heart sounds: No murmur heard.  Pulmonary:      Effort: Pulmonary effort is normal. No respiratory distress.      Breath sounds: Normal breath sounds.   Abdominal:      Palpations: Abdomen is soft.      Tenderness: There is no abdominal tenderness.   Musculoskeletal:         General: No swelling.      Cervical back: Neck supple.   Skin:     General: Skin is warm and dry.      Capillary Refill: Capillary refill takes less than 2 seconds.   Neurological:      Mental Status: She is alert and oriented to person, place, and time.   Psychiatric:         Mood and Affect: Mood normal.           Discussion with Family: Updated  (son) at bedside.    Discharge instructions/Information to patient and family:   See after visit summary for information provided to patient and family.      Provisions for Follow-Up Care:  See after visit summary for information related to follow-up care and any pertinent home health orders.      Mobility at time of Discharge:   Basic Mobility Inpatient Raw Score: 14  JH-HLM Goal: 4: Move to chair/commode  JH-HLM Achieved: 4: Move to chair/commode  HLM Goal achieved. Continue to encourage appropriate mobility.     Disposition:   Home    Planned Readmission: none     Discharge Statement:  I spent 45 minutes discharging the patient. This time was spent on the day of discharge. I had direct contact with the patient on the day of discharge. Greater than 50% of the total time was spent examining patient, answering all patient questions, arranging and discussing plan  of care with patient as well as directly providing post-discharge instructions.  Additional time then spent on discharge activities.    Discharge Medications:  See after visit summary for reconciled discharge medications provided to patient and/or family.      **Please Note: This note may have been constructed using a voice recognition system**

## 2025-05-10 NOTE — PLAN OF CARE
Problem: GASTROINTESTINAL - ADULT  Goal: Maintains adequate nutritional intake  Description: INTERVENTIONS:- Monitor percentage of each meal consumed- Identify factors contributing to decreased intake, treat as appropriate- Assist with meals as needed- Monitor I&O, weight, and lab values if indicated- Obtain nutrition services referral as needed  Outcome: Progressing  Goal: Oral mucous membranes remain intact  Description: INTERVENTIONS- Assess oral mucosa and hygiene practices- Implement preventative oral hygiene regimen- Implement oral medicated treatments as ordered- Initiate Nutrition services referral as needed  Outcome: Progressing

## 2025-05-10 NOTE — ASSESSMENT & PLAN NOTE
Sudden onset difficulty swallowing solids and liquids after having breakfast and pills, doesn't think anything got stuck in there  ED reached out to GI, plan for EGD tomm   Will get cxr check for foreign body   Clears for now if she tolerates   Ekg ordered

## 2025-05-10 NOTE — ASSESSMENT & PLAN NOTE
Sudden onset difficulty swallowing solids and liquids after having breakfast and pills, doesn't think anything got stuck in there  ED reached out to GI, initial plan for EGD  Chest xray obtained image reviewed no direct visualization of object seen will trend for final reading from radiology   GI evaluated swallowing difficulties appear to be improved  At this time defer EGD to outpatient  Attempted diet with patient able to swallow cleared from GI perspective and will follow-up with her outpatient  Medically cleared for discharge

## 2025-05-10 NOTE — ASSESSMENT & PLAN NOTE
Patient was admitted to the hospital with dysphagia.  She has history of esophageal dysphagia with esophageal stricture in 2020 that was treated with dilation.  Patient reports otherwise she was doing well up until recently.  There was concern for food bolus, however the patient has been able to drink clear liquids without regurgitation.  This morning at the bedside was able to take several sips of water and tolerate.  Spoke with patient, patient's daughter over the phone and her son who was present at the bedside.  Patient is concerned about her being on Xarelto and being Nondenominational with regard to endoscopy.  -We agreed to have patient come back as an outpatient on Wednesday with Dr. Jacques to perform endoscopy off of Xarelto so that intervention can be performed with lower bleeding risk especially in the setting of potential reoccurrence of esophageal stricture  -Will send a message to Dr. Jacques  to arrange with patient's daughter, Chani  -In the meantime she is to continue PPI daily, eating slowly, remaining upright after meals, chew thoroughly, and I encouraged her to only eat a soft diet avoiding hard consistency foods like bread, meat, etc. would prefer she eat more soft to puréed consistencies.  -Discussed with internal medicine team  -Discharge pending toleration of diet

## 2025-05-10 NOTE — UTILIZATION REVIEW
Initial Clinical Review    Admission: Date/Time/Statement:   Admission Orders (From admission, onward)       Ordered        05/09/25 1913  INPATIENT ADMISSION  Once                          Orders Placed This Encounter   Procedures    INPATIENT ADMISSION     Standing Status:   Standing     Number of Occurrences:   1     Level of Care:   Med Surg [16]     Estimated length of stay:   More than 2 Midnights     Certification:   I certify that inpatient services are medically necessary for this patient for a duration of greater than two midnights. See H&P and MD Progress Notes for additional information about the patient's course of treatment.     ED Arrival Information       Expected   -    Arrival   5/9/2025 17:29    Acuity   Urgent              Means of arrival   Ambulance    Escorted by   [facility]    Service   Hospitalist    Admission type   Emergency              Arrival complaint   -             Chief Complaint   Patient presents with    Medical Problem     Pt arrives ems from home, states taking her pills this morning around 10 am and felt pills get stuck and has been feeling this ever since, has been drinking water with minimal relief, ems administered 1mg iv glucagon with minimal relief        Initial Presentation: 84 y.o. female to ED from home w/ PMH of with sudden onset difficutly swallowing after having breakfast and trying to take her morning pills. She states she tried to drink water but it came right back up but thicker almost like saliva. Has not attempted to eat again after that. This has never happened before. Admitted IP status w/ difficluty swallowing . GI consulted and plan for EGD . CXR to check for FB . Clears if tolerates , EKG . Neuropathy hold gabapentin . HTN iv prn if unable to swallow .     Anticipated Length of Stay/Certification Statement: Patient will be admitted on an inpatient basis with an anticipated length of stay of greater than 2 midnights secondary to GI denia solorzano  jose EGD.       Date: 5/10   Day 2: pt medically cleared for DC . Plan to defer EGD to OP . Attempted diet and pt able to swallow cleared from GI perspective . DC to home w/ son .    5/10 GI Consult   Dysphagia, history of DVT on Xarelto, [place], iron deficiency .  plan for outpatient EGD expedited after being off of Xarelto so can perform possible dilation if needed. Continue PPI daily. Counseled on chewing food extensively before swallowing. Consider IV Venofer for iron deficiency anemia. Gastroenterology will sign off.             ED Treatment-Medication Administration from 05/09/2025 1728 to 05/09/2025 1951         Date/Time Order Dose Route Action     05/09/2025 1734 glucagon (FOR EMS ONLY) (GLUCAGEN) injection 1 mg 0 mg Does not apply Given to EMS     05/09/2025 1805 sodium chloride 0.9 % bolus 1,000 mL 1,000 mL Intravenous New Bag     05/09/2025 1805 pantoprazole (PROTONIX) injection 40 mg 40 mg Intravenous Given            Scheduled Medications:  amLODIPine, 10 mg, Oral, Daily   And  atorvastatin, 40 mg, Oral, Daily  labetalol, 100 mg, Oral, Q12H DEO  rivaroxaban, 20 mg, Oral, Daily With Breakfast      Continuous IV Infusions:     PRN Meds:  metoprolol, 2.5 mg, Intravenous, Q6H PRN      ED Triage Vitals   Temperature Pulse Respirations Blood Pressure SpO2 Pain Score   05/09/25 1731 05/09/25 1731 05/09/25 1731 05/09/25 1731 05/09/25 1731 05/09/25 2121   98.4 °F (36.9 °C) 74 18 (!) 199/95 91 % No Pain     Weight (last 2 days)       Date/Time Weight    05/10/25 0749 87.1 (192)            Vital Signs (last 3 days)       Date/Time Temp Pulse Resp BP MAP (mmHg) SpO2 O2 Device Patient Position - Orthostatic VS Pain    05/10/25 0749 -- -- -- -- -- 96 % None (Room air) -- No Pain    05/10/25 06:22:05 98.5 °F (36.9 °C) 74 16 170/90 117 94 % None (Room air) Sitting --    05/10/25 06:21:25 98.5 °F (36.9 °C) 74 -- 170/90 117 95 % -- -- --    05/10/25 00:19:04 -- 73 -- 173/91 118 94 % -- -- --    05/09/25  21:21:13 -- 78 -- 175/93 120 95 % None (Room air) -- No Pain    05/09/25 20:00:54 97.5 °F (36.4 °C) 70 17 176/94 121 98 % -- -- --    05/09/25 1830 -- -- -- -- -- -- None (Room air) -- --    05/09/25 1731 98.4 °F (36.9 °C) 74 18 199/95 137 91 % None (Room air) Lying --              Pertinent Labs/Diagnostic Test Results:   Radiology:  XR chest portable    (Results Pending)     Cardiology:  No orders to display     GI:  No orders to display           Results from last 7 days   Lab Units 05/10/25  0209 05/09/25  1822   WBC Thousand/uL 9.97 12.15*   HEMOGLOBIN g/dL 9.8* 9.8*   HEMATOCRIT % 31.8* 31.8*   PLATELETS Thousands/uL 193 188   TOTAL NEUT ABS Thousands/µL 7.97* 10.44*       Results from last 7 days   Lab Units 05/10/25  0209 05/09/25  1822   SODIUM mmol/L 140 142   POTASSIUM mmol/L 4.0 3.6   CHLORIDE mmol/L 105 107   CO2 mmol/L 27 27   ANION GAP mmol/L 8 8   BUN mg/dL 13 13   CREATININE mg/dL 0.92 0.98   EGFR ml/min/1.73sq m 56 52   CALCIUM mg/dL 9.6 9.4     Results from last 7 days   Lab Units 05/10/25  0209 05/09/25  1822   GLUCOSE RANDOM mg/dL 89 139       Results from last 7 days   Lab Units 05/09/25  1822   FERRITIN ng/mL 328*   IRON SATURATION % 10*   IRON ug/dL 29*   TIBC ug/dL 289.8     Results from last 7 days   Lab Units 05/09/25  1822   TRANSFERRIN mg/dL 207       Past Medical History:   Diagnosis Date    Anemia     Cardiac disease     Cervical spondylolysis     Cervical stenosis of spine     CHF (congestive heart failure) (HCC)     DVT (deep venous thrombosis) (HCC)     Hypertension     Neuropathy     from fall accident    Osteoarthritis      Present on Admission:   Neuropathy   Hypertension   Iron deficiency anemia      Admitting Diagnosis: Vomiting [R11.10]  Foreign body in throat [T17.208A]  Age/Sex: 84 y.o. female    Network Utilization Review Department  ATTENTION: Please call with any questions or concerns to [phone] and carefully listen to the prompts so that you are directed to the  right person. All voicemails are confidential.   For Discharge needs, contact Care Management DC Support Team at [phone] opt. 2  Send all requests for admission clinical reviews, approved or denied determinations and any other requests to dedicated fax number below belonging to the campus where the patient is receiving treatment. List of dedicated fax numbers for the Facilities:  FACILITY NAME UR FAX NUMBER   ADMISSION DENIALS (Administrative/Medical Necessity) [phone]   DISCHARGE SUPPORT TEAM (NETWORK) [phone]   PARENT CHILD HEALTH (Maternity/NICU/Pediatrics) [phone]   [facility] [phone]   [facility] [phone]   [facility] [phone]   [facility] [phone]   [facility] [phone]   [facility] [phone]   [facility] [phone]   [facility] [phone]   [facility] [phone]   [facility] [phone]   [facility] [phone]   [facility] [phone]

## 2025-05-10 NOTE — CONSULTS
Consultation - Gastroenterology   Name: Fanta Bishop 84 y.o. female I MRN: [id number]  Unit/Bed#: -01 I Date of Admission: 5/9/2025   Date of Service: 5/10/2025 I Hospital Day: 1   Inpatient consult to gastroenterology  Consult performed by: Madelaine Sullivan PA-C  Consult ordered by: Hever Washington MD        Physician Requesting Evaluation: Slime Carbone MD   Reason for Evaluation / Principal Problem: Dysphagia    Assessment & Plan  Difficulty swallowing  Patient was admitted to the hospital with dysphagia.  She has history of esophageal dysphagia with esophageal stricture in 2020 that was treated with dilation.  Patient reports otherwise she was doing well up until recently.  There was concern for food bolus, however the patient has been able to drink clear liquids without regurgitation.  This morning at the bedside was able to take several sips of water and tolerate.  Spoke with patient, patient's daughter over the phone and her son who was present at the bedside.  Patient is concerned about her being on Xarelto and being Hindu with regard to endoscopy.  -We agreed to have patient come back as an outpatient on Wednesday with Dr. Jacques to perform endoscopy off of Xarelto so that intervention can be performed with lower bleeding risk especially in the setting of potential reoccurrence of esophageal stricture  -Will send a message to Dr. Jacques  to arrange with patient's daughter, Chani  -In the meantime she is to continue PPI daily, eating slowly, remaining upright after meals, chew thoroughly, and I encouraged her to only eat a soft diet avoiding hard consistency foods like bread, meat, etc. would prefer she eat more soft to puréed consistencies.  -Discussed with internal medicine team  -Discharge pending toleration of diet  Iron deficiency anemia  Patient is a Hindu. Consider IV Venofer.  I have discussed the above management plan in detail with the primary service.   Patient was seen and examined by Dr. Jacques, all medical decisions made with Dr. Jacques.  GI will sign off.  Please call with questions.    History of Present Illness   HPI:  Fanta Bishop is a 84 y.o. female with history of DVT on Xarelto, hypertension, iron deficiency anemia with a baseline hemoglobin of 9, and previous esophageal stricture who presented to the hospital for dysphagia.  There was initial concern for food bolus, however the patient was able to tolerate clear liquids without regurgitating.     Patient was seen at the bedside this morning, her son was present at the bedside and her daughter was present via speaker phone.  She has no complaints at this time.    Her last EGD was performed at [facility] in February 2020.  An esophageal stricture was noted and dilated to 15 mm or 45 Albanian.  Patient reports that after that she was doing very well and has not had dysphagia until just a week ago.    Review of Systems   Constitutional:  Negative for appetite change, chills, diaphoresis, fatigue and unexpected weight change.   HENT:  Negative for sore throat and trouble swallowing.    Eyes:  Negative for discharge and redness.   Respiratory:  Negative for cough, shortness of breath and wheezing.    Cardiovascular:  Negative for chest pain and palpitations.   Gastrointestinal:  Negative for abdominal pain, anal bleeding, blood in stool, constipation, diarrhea, nausea, rectal pain and vomiting.   Endocrine: Negative for cold intolerance and heat intolerance.   Musculoskeletal:  Negative for joint swelling and myalgias.   Skin:  Negative for pallor and rash.   Neurological:  Negative for dizziness, tremors, weakness, light-headedness, numbness and headaches.   Hematological:  Negative for adenopathy. Does not bruise/bleed easily.   Psychiatric/Behavioral:  Negative for behavioral problems, confusion, dysphoric mood and sleep disturbance. The patient is not nervous/anxious.      Medical History Review: I have  reviewed the patient's PMH, PSH, Social History, Family History, Meds, and Allergies     Objective :  Temp:  [97.5 °F (36.4 °C)-98.5 °F (36.9 °C)] 98.5 °F (36.9 °C)  HR:  [70-78] 74  BP: (170-199)/(90-95) 170/90  Resp:  [16-18] 16  SpO2:  [91 %-98 %] 96 %  O2 Device: None (Room air)    Physical Exam  Constitutional:       General: She is not in acute distress.     Appearance: She is well-developed. She is not diaphoretic.   HENT:      Head: Normocephalic and atraumatic.   Eyes:      General: No scleral icterus.     Conjunctiva/sclera: Conjunctivae normal.      Pupils: Pupils are equal, round, and reactive to light.   Neck:      Thyroid: No thyromegaly.      Trachea: No tracheal deviation.   Cardiovascular:      Rate and Rhythm: Normal rate and regular rhythm.   Pulmonary:      Effort: Pulmonary effort is normal.      Breath sounds: Normal breath sounds. No wheezing or rales.   Abdominal:      General: Bowel sounds are normal. There is no distension.      Palpations: Abdomen is soft. Abdomen is not rigid. There is no mass.      Tenderness: There is no abdominal tenderness. There is no guarding or rebound.   Musculoskeletal:      Cervical back: Neck supple.   Lymphadenopathy:      Cervical: No cervical adenopathy.   Skin:     General: Skin is warm and dry.      Findings: No erythema or rash.   Neurological:      Mental Status: She is alert and oriented to person, place, and time.   Psychiatric:         Behavior: Behavior normal.         Lab Results: I have reviewed the following results:

## 2025-05-12 ENCOUNTER — TELEPHONE (OUTPATIENT)
Age: 85
End: 2025-05-12

## 2025-05-12 NOTE — UTILIZATION REVIEW
NOTIFICATION OF ADMISSION DISCHARGE   This is a Notification of Discharge from [facility]. Please be advised that this patient has been discharge from our facility. Below you will find the admission and discharge date and time including the patient’s disposition.   UTILIZATION REVIEW CONTACT:  Utilization Review Assistants  Network Utilization Review Department  Phone: [phone] x carefully listen to the prompts. All voicemails are confidential.  Email: [email or web address]     ADMISSION INFORMATION  PRESENTATION DATE: 5/9/2025  5:29 PM  OBERVATION ADMISSION DATE: N/A  INPATIENT ADMISSION DATE: 5/9/25  7:13 PM   DISCHARGE DATE: 5/10/2025  2:49 PM   DISPOSITION:Home/Self Care    Network Utilization Review Department  ATTENTION: Please call with any questions or concerns to [phone] and carefully listen to the prompts so that you are directed to the right person. All voicemails are confidential.   For Discharge needs, contact Care Management DC Support Team at [phone] opt. 2  Send all requests for admission clinical reviews, approved or denied determinations and any other requests to dedicated fax number below belonging to the campus where the patient is receiving treatment. List of dedicated fax numbers for the Facilities:  FACILITY NAME UR FAX NUMBER   ADMISSION DENIALS (Administrative/Medical Necessity) [phone]   DISCHARGE SUPPORT TEAM ([facility]) [phone]   PARENT CHILD HEALTH (Maternity/NICU/Pediatrics) [phone]   [facility] [phone]   [facility] [phone]   [facility] [phone]   [facility] [phone]   [facility] [phone]   [facility] [phone]   [facility] [phone]   [facility] [phone]   [facility] [phone]   [facility] [phone]   [facility] [phone]   [facility] [phone]

## 2025-05-12 NOTE — TELEPHONE ENCOUNTER
----- Message from Madelaine Sullivan PA-C sent at 5/10/2025 11:03 AM EDT -----  Erasmo Alexandera Hope he had a great Mother's Day weekend!    Dr. Jacques wants to do this endoscopy on Wednesday.  Please contact patient's daughter Chani.  The patient is on Xarelto.  I told him to stop the medication on Monday.  Thank you

## 2025-05-13 NOTE — TELEPHONE ENCOUNTER
Scheduled date of EGD(as of today): 5/14/25  Physician performing EGD: Georgie  Location of EGD: [place]  Instructions reviewed with patient by: Rosa VALERIO  Clearances:

## 2025-05-14 ENCOUNTER — HOSPITAL ENCOUNTER (OUTPATIENT)
Dept: GASTROENTEROLOGY | Facility: HOSPITAL | Age: 85
Setting detail: OUTPATIENT SURGERY
Discharge: HOME | End: 2025-05-14
Attending: INTERNAL MEDICINE
Payer: COMMERCIAL

## 2025-05-14 ENCOUNTER — ANESTHESIA EVENT (OUTPATIENT)
Dept: GASTROENTEROLOGY | Facility: HOSPITAL | Age: 85
End: 2025-05-14
Payer: COMMERCIAL

## 2025-05-14 ENCOUNTER — ANESTHESIA (OUTPATIENT)
Dept: GASTROENTEROLOGY | Facility: HOSPITAL | Age: 85
End: 2025-05-14
Payer: COMMERCIAL

## 2025-05-14 VITALS
TEMPERATURE: 97 F | HEIGHT: 67 IN | BODY MASS INDEX: 30.76 KG/M2 | WEIGHT: 195.99 LBS | DIASTOLIC BLOOD PRESSURE: 62 MMHG | SYSTOLIC BLOOD PRESSURE: 120 MMHG | HEART RATE: 66 BPM | OXYGEN SATURATION: 96 % | RESPIRATION RATE: 23 BRPM

## 2025-05-14 DIAGNOSIS — R13.10 DYSPHAGIA, UNSPECIFIED TYPE: ICD-10-CM

## 2025-05-14 PROCEDURE — 88305 TISSUE EXAM BY PATHOLOGIST: CPT | Performed by: STUDENT IN AN ORGANIZED HEALTH CARE EDUCATION/TRAINING PROGRAM

## 2025-05-14 RX ORDER — PROPOFOL 10 MG/ML
INJECTION, EMULSION INTRAVENOUS AS NEEDED
Status: DISCONTINUED | OUTPATIENT
Start: 2025-05-14 | End: 2025-05-14

## 2025-05-14 RX ORDER — LIDOCAINE HYDROCHLORIDE 20 MG/ML
INJECTION, SOLUTION EPIDURAL; INFILTRATION; INTRACAUDAL; PERINEURAL AS NEEDED
Status: DISCONTINUED | OUTPATIENT
Start: 2025-05-14 | End: 2025-05-14

## 2025-05-14 RX ORDER — SODIUM CHLORIDE, SODIUM LACTATE, POTASSIUM CHLORIDE, CALCIUM CHLORIDE 600; 310; 30; 20 MG/100ML; MG/100ML; MG/100ML; MG/100ML
125 INJECTION, SOLUTION INTRAVENOUS CONTINUOUS
Status: DISCONTINUED | OUTPATIENT
Start: 2025-05-14 | End: 2025-05-18 | Stop reason: HOSPADM

## 2025-05-14 RX ORDER — SODIUM CHLORIDE, SODIUM LACTATE, POTASSIUM CHLORIDE, CALCIUM CHLORIDE 600; 310; 30; 20 MG/100ML; MG/100ML; MG/100ML; MG/100ML
INJECTION, SOLUTION INTRAVENOUS CONTINUOUS PRN
Status: DISCONTINUED | OUTPATIENT
Start: 2025-05-14 | End: 2025-05-14

## 2025-05-14 RX ADMIN — LIDOCAINE HYDROCHLORIDE 80 MG: 20 INJECTION, SOLUTION EPIDURAL; INFILTRATION; INTRACAUDAL; PERINEURAL at 11:10

## 2025-05-14 RX ADMIN — PROPOFOL 100 MG: 10 INJECTION, EMULSION INTRAVENOUS at 11:10

## 2025-05-14 RX ADMIN — SODIUM CHLORIDE, SODIUM LACTATE, POTASSIUM CHLORIDE, AND CALCIUM CHLORIDE: .6; .31; .03; .02 INJECTION, SOLUTION INTRAVENOUS at 11:06

## 2025-05-14 NOTE — H&P
"History and Physical -  [facility]  Fanta Bishop 84 y.o. female MRN: [id number]                  HPI: Fanta Bishop is a 84 y.o. year old female who presents for EGD for dysphagia.      REVIEW OF SYSTEMS: Per the HPI, and otherwise unremarkable.    Historical Information   Past Medical History:   Diagnosis Date    Anemia     Cardiac disease     Cervical spondylolysis     Cervical stenosis of spine     CHF (congestive heart failure) (HCC)     DVT (deep venous thrombosis) (HCC)     Hypertension     Neuropathy     from fall accident    No blood products     Osteoarthritis      Past Surgical History:   Procedure Laterality Date    BACK SURGERY      CARDIAC PACEMAKER PLACEMENT       Social History   Social History     Substance and Sexual Activity   Alcohol Use No     Social History     Substance and Sexual Activity   Drug Use No     Tobacco Use History[1]  History reviewed. No pertinent family history.    Meds/Allergies     Current Medications[2]    Allergies[3]    Objective     /60   Pulse 59   Temp 97.9 °F (36.6 °C) (Temporal)   Resp 16   Ht 5' 7\" (1.702 m)   Wt 88.9 kg (195 lb 15.8 oz)   SpO2 98%   BMI 30.70 kg/m²       PHYSICAL EXAM    Gen: NAD  Head: NCAT  CV: RRR  CHEST: Clear  ABD: soft, NT/ND  EXT: no edema      ASSESSMENT/PLAN:  Fanta Bishop is a 84 y.o. year old female who presents for EGD for dysphagia. The patient is stable and optimized for the procedure, we reviewed risk and benefits. Risk include but not limited to infection, bleeding, perforation and missing a lesion.               [1]   Social History  Tobacco Use   Smoking Status Never   Smokeless Tobacco Never   [2]   Current Outpatient Medications:     amLODIPine-atorvastatin (CADUET) 10-40 MG per tablet    Calcium Carbonate-Vit D-Min (CALCIUM 1200 PO)    famotidine (PEPCID) 40 MG tablet    furosemide (LASIX) 40 mg tablet    gabapentin (NEURONTIN) 100 mg capsule    labetalol (NORMODYNE) 100 mg tablet    oxybutynin " "(DITROPAN) 5 mg tablet    potassium chloride (Klor-Con) 10 mEq tablet    rivaroxaban (XARELTO) 20 mg tablet    olmesartan-hydrochlorothiazide (BENICAR HCT) 40-12.5 MG per tablet    Current Facility-Administered Medications:     lactated ringers infusion, 125 mL/hr, Intravenous, Continuous  [3]   Allergies  Allergen Reactions    Codeine      \"made me pass out\"     "

## 2025-05-14 NOTE — ANESTHESIA PREPROCEDURE EVALUATION
Procedure:  EGD    Relevant Problems   ANESTHESIA (within normal limits)      CARDIO   (+) Hypertension      ENDO (within normal limits)      GI/HEPATIC   (+) Difficulty swallowing      /RENAL (within normal limits)      GYN (within normal limits)      HEMATOLOGY   (+) Iron deficiency anemia      MUSCULOSKELETAL (within normal limits)      NEURO/PSYCH (within normal limits)      PULMONARY (within normal limits)      Other   (+) History of DVT of lower extremity        Physical Exam    Airway     Mallampati score: II  TM Distance: >3 FB  Neck ROM: full      Cardiovascular  Cardiovascular exam normal    Dental    edentulous    Pulmonary  Pulmonary exam normal     Neurological  - normal exam    Other Findings  post-pubertal.      Anesthesia Plan  ASA Score- 3     Anesthesia Type- MAC with ASA Monitors.         Additional Monitors:     Airway Plan:            Plan Factors-Exercise tolerance (METS): <4 METS.    Chart reviewed. EKG reviewed. Imaging results reviewed. Existing labs reviewed. Patient summary reviewed.    Patient is not a current smoker.              Induction- intravenous.    Postoperative Plan- .   Monitoring Plan - Monitoring plan - standard ASA monitoring  Post Operative Pain Plan - non-opiod analgesics    Perioperative Resuscitation Plan - Level 1 - Full Code.       Informed Consent- Anesthetic plan and risks discussed with patient.        NPO Status:  Vitals Value Taken Time   Date of last liquid 05/14/25 05/14/25 10:20   Time of last liquid 0700 05/14/25 10:20   Date of last solid 05/13/25 05/14/25 10:20   Time of last solid 2100 05/14/25 10:20       Discussed IV Sedation with General Anesthesia as backup with patient including but not limited to risk of cardiac insult, pulmonary complication, stroke, reaction to medications and death. All questions answered and consent was obtained.      JW, reviewed list of transfusions.

## 2025-05-14 NOTE — ANESTHESIA POSTPROCEDURE EVALUATION
Post-Op Assessment Note    CV Status:  Stable  Pain Score: 0    Pain management: adequate       Mental Status:  Awake and alert   Hydration Status:  Euvolemic   PONV Controlled:  Controlled   Airway Patency:  Patent and adequate     Post Op Vitals Reviewed: Yes    No anethesia notable event occurred.    Staff: CRNA           Vitals:    05/14/25 1145   BP: 120/62   Pulse: 66   Resp: (!) 23   Temp:    SpO2: 96%           Modified Ingrid:     Vitals Value Taken Time   Activity 2 05/14/25 11:46   Respiration 2 05/14/25 11:46   Circulation 2 05/14/25 11:46   Consciousness 2 05/14/25 11:46   Oxygen Saturation 2 05/14/25 11:46     Modified Ingrid Score: 10

## 2025-05-20 PROCEDURE — 88305 TISSUE EXAM BY PATHOLOGIST: CPT | Performed by: STUDENT IN AN ORGANIZED HEALTH CARE EDUCATION/TRAINING PROGRAM

## 2025-05-23 ENCOUNTER — RESULTS FOLLOW-UP (OUTPATIENT)
Dept: GASTROENTEROLOGY | Facility: CLINIC | Age: 85
End: 2025-05-23

## 2025-07-22 ENCOUNTER — APPOINTMENT (EMERGENCY)
Dept: RADIOLOGY | Facility: HOSPITAL | Age: 85
End: 2025-07-22
Payer: COMMERCIAL

## 2025-07-22 ENCOUNTER — HOSPITAL ENCOUNTER (INPATIENT)
Facility: HOSPITAL | Age: 85
LOS: 3 days | Discharge: HOME | End: 2025-07-25
Admitting: INTERNAL MEDICINE
Payer: COMMERCIAL

## 2025-07-22 ENCOUNTER — APPOINTMENT (EMERGENCY)
Dept: CT IMAGING | Facility: HOSPITAL | Age: 85
End: 2025-07-22
Payer: COMMERCIAL

## 2025-07-22 DIAGNOSIS — D49.1 LUNG TUMOR: ICD-10-CM

## 2025-07-22 DIAGNOSIS — J90 PLEURAL EFFUSION: Primary | ICD-10-CM

## 2025-07-22 DIAGNOSIS — I31.39 PERICARDIAL EFFUSION: ICD-10-CM

## 2025-07-22 DIAGNOSIS — I10 HTN (HYPERTENSION): ICD-10-CM

## 2025-07-22 PROBLEM — I16.0 HYPERTENSIVE URGENCY: Status: ACTIVE | Noted: 2025-07-22

## 2025-07-22 LAB
2HR DELTA HS TROPONIN: 0 NG/L
ALBUMIN SERPL BCG-MCNC: 4.1 G/DL (ref 3.5–5)
ALP SERPL-CCNC: 83 U/L (ref 34–104)
ALT SERPL W P-5'-P-CCNC: 22 U/L (ref 7–52)
ANION GAP SERPL CALCULATED.3IONS-SCNC: 6 MMOL/L (ref 4–13)
AST SERPL W P-5'-P-CCNC: 17 U/L (ref 13–39)
BASOPHILS # BLD AUTO: 0.02 THOUSANDS/ÂΜL (ref 0–0.1)
BASOPHILS NFR BLD AUTO: 0 % (ref 0–1)
BILIRUB SERPL-MCNC: 0.67 MG/DL (ref 0.2–1)
BNP SERPL-MCNC: 224 PG/ML (ref 0–100)
BUN SERPL-MCNC: 14 MG/DL (ref 5–25)
CALCIUM SERPL-MCNC: 8.9 MG/DL (ref 8.4–10.2)
CARDIAC TROPONIN I PNL SERPL HS: 8 NG/L (ref ?–50)
CARDIAC TROPONIN I PNL SERPL HS: 8 NG/L (ref ?–50)
CHLORIDE SERPL-SCNC: 109 MMOL/L (ref 96–108)
CO2 SERPL-SCNC: 27 MMOL/L (ref 21–32)
CREAT SERPL-MCNC: 0.81 MG/DL (ref 0.6–1.3)
D DIMER PPP FEU-MCNC: 2.88 UG/ML FEU
EOSINOPHIL # BLD AUTO: 0.23 THOUSAND/ÂΜL (ref 0–0.61)
EOSINOPHIL NFR BLD AUTO: 2 % (ref 0–6)
ERYTHROCYTE [DISTWIDTH] IN BLOOD BY AUTOMATED COUNT: 20.3 % (ref 11.6–15.1)
GFR SERPL CREATININE-BSD FRML MDRD: 65 ML/MIN/1.73SQ M
GLUCOSE SERPL-MCNC: 126 MG/DL (ref 65–140)
HCT VFR BLD AUTO: 35.3 % (ref 34.8–46.1)
HGB BLD-MCNC: 11.1 G/DL (ref 11.5–15.4)
IMM GRANULOCYTES # BLD AUTO: 0.04 THOUSAND/UL (ref 0–0.2)
IMM GRANULOCYTES NFR BLD AUTO: 0 % (ref 0–2)
LYMPHOCYTES # BLD AUTO: 1.14 THOUSANDS/ÂΜL (ref 0.6–4.47)
LYMPHOCYTES NFR BLD AUTO: 12 % (ref 14–44)
MCH RBC QN AUTO: 24.6 PG (ref 26.8–34.3)
MCHC RBC AUTO-ENTMCNC: 31.4 G/DL (ref 31.4–37.4)
MCV RBC AUTO: 78 FL (ref 82–98)
MONOCYTES # BLD AUTO: 0.43 THOUSAND/ÂΜL (ref 0.17–1.22)
MONOCYTES NFR BLD AUTO: 5 % (ref 4–12)
NEUTROPHILS # BLD AUTO: 7.64 THOUSANDS/ÂΜL (ref 1.85–7.62)
NEUTS SEG NFR BLD AUTO: 81 % (ref 43–75)
NRBC BLD AUTO-RTO: 0 /100 WBCS
PLATELET # BLD AUTO: 215 THOUSANDS/UL (ref 149–390)
PMV BLD AUTO: 10.8 FL (ref 8.9–12.7)
POTASSIUM SERPL-SCNC: 5.1 MMOL/L (ref 3.5–5.3)
PROT SERPL-MCNC: 7.9 G/DL (ref 6.4–8.4)
RBC # BLD AUTO: 4.51 MILLION/UL (ref 3.81–5.12)
SODIUM SERPL-SCNC: 142 MMOL/L (ref 135–147)
TSH SERPL DL<=0.05 MIU/L-ACNC: 1.83 UIU/ML (ref 0.45–4.5)
WBC # BLD AUTO: 9.5 THOUSAND/UL (ref 4.31–10.16)

## 2025-07-22 PROCEDURE — 85379 FIBRIN DEGRADATION QUANT: CPT

## 2025-07-22 PROCEDURE — 85025 COMPLETE CBC W/AUTO DIFF WBC: CPT

## 2025-07-22 PROCEDURE — 84484 ASSAY OF TROPONIN QUANT: CPT

## 2025-07-22 PROCEDURE — 93005 ELECTROCARDIOGRAM TRACING: CPT

## 2025-07-22 PROCEDURE — 36415 COLL VENOUS BLD VENIPUNCTURE: CPT

## 2025-07-22 PROCEDURE — 80053 COMPREHEN METABOLIC PANEL: CPT

## 2025-07-22 PROCEDURE — 84443 ASSAY THYROID STIM HORMONE: CPT | Performed by: INTERNAL MEDICINE

## 2025-07-22 PROCEDURE — 99223 1ST HOSP IP/OBS HIGH 75: CPT | Performed by: INTERNAL MEDICINE

## 2025-07-22 PROCEDURE — 71045 X-RAY EXAM CHEST 1 VIEW: CPT

## 2025-07-22 PROCEDURE — 99285 EMERGENCY DEPT VISIT HI MDM: CPT

## 2025-07-22 PROCEDURE — 71275 CT ANGIOGRAPHY CHEST: CPT

## 2025-07-22 PROCEDURE — 83880 ASSAY OF NATRIURETIC PEPTIDE: CPT

## 2025-07-22 RX ORDER — LABETALOL HYDROCHLORIDE 5 MG/ML
10 INJECTION, SOLUTION INTRAVENOUS EVERY 12 HOURS PRN
Status: DISCONTINUED | OUTPATIENT
Start: 2025-07-22 | End: 2025-07-25 | Stop reason: HOSPADM

## 2025-07-22 RX ORDER — DOCUSATE SODIUM 100 MG/1
100 CAPSULE, LIQUID FILLED ORAL 2 TIMES DAILY
Status: DISCONTINUED | OUTPATIENT
Start: 2025-07-22 | End: 2025-07-25 | Stop reason: HOSPADM

## 2025-07-22 RX ORDER — ATORVASTATIN CALCIUM 40 MG/1
40 TABLET, FILM COATED ORAL DAILY
Status: DISCONTINUED | OUTPATIENT
Start: 2025-07-23 | End: 2025-07-25 | Stop reason: HOSPADM

## 2025-07-22 RX ORDER — POTASSIUM CHLORIDE 750 MG/1
10 TABLET, EXTENDED RELEASE ORAL
Status: DISCONTINUED | OUTPATIENT
Start: 2025-07-23 | End: 2025-07-25 | Stop reason: HOSPADM

## 2025-07-22 RX ORDER — LABETALOL 100 MG/1
200 TABLET, FILM COATED ORAL EVERY 12 HOURS SCHEDULED
Status: DISCONTINUED | OUTPATIENT
Start: 2025-07-22 | End: 2025-07-25 | Stop reason: HOSPADM

## 2025-07-22 RX ORDER — FUROSEMIDE 40 MG/1
40 TABLET ORAL DAILY
Status: DISCONTINUED | OUTPATIENT
Start: 2025-07-23 | End: 2025-07-25 | Stop reason: HOSPADM

## 2025-07-22 RX ORDER — HYDROCHLOROTHIAZIDE 12.5 MG/1
12.5 TABLET ORAL DAILY
Status: DISCONTINUED | OUTPATIENT
Start: 2025-07-23 | End: 2025-07-25 | Stop reason: HOSPADM

## 2025-07-22 RX ORDER — OXYBUTYNIN CHLORIDE 5 MG/1
5 TABLET ORAL 2 TIMES DAILY
Status: DISCONTINUED | OUTPATIENT
Start: 2025-07-22 | End: 2025-07-25 | Stop reason: HOSPADM

## 2025-07-22 RX ORDER — ACETAMINOPHEN 325 MG/1
650 TABLET ORAL EVERY 6 HOURS PRN
Status: DISCONTINUED | OUTPATIENT
Start: 2025-07-22 | End: 2025-07-25 | Stop reason: HOSPADM

## 2025-07-22 RX ORDER — AMLODIPINE BESYLATE 10 MG/1
10 TABLET ORAL DAILY
Status: DISCONTINUED | OUTPATIENT
Start: 2025-07-23 | End: 2025-07-25 | Stop reason: HOSPADM

## 2025-07-22 RX ORDER — LOSARTAN POTASSIUM 50 MG/1
100 TABLET ORAL DAILY
Status: DISCONTINUED | OUTPATIENT
Start: 2025-07-23 | End: 2025-07-25 | Stop reason: HOSPADM

## 2025-07-22 RX ORDER — ONDANSETRON 2 MG/ML
4 INJECTION INTRAMUSCULAR; INTRAVENOUS EVERY 6 HOURS PRN
Status: DISCONTINUED | OUTPATIENT
Start: 2025-07-22 | End: 2025-07-22

## 2025-07-22 RX ORDER — GABAPENTIN 100 MG/1
100 CAPSULE ORAL
Status: DISCONTINUED | OUTPATIENT
Start: 2025-07-22 | End: 2025-07-25 | Stop reason: HOSPADM

## 2025-07-22 RX ADMIN — DOCUSATE SODIUM 100 MG: 100 CAPSULE, LIQUID FILLED ORAL at 19:36

## 2025-07-22 RX ADMIN — OXYBUTYNIN CHLORIDE 5 MG: 5 TABLET ORAL at 19:36

## 2025-07-22 RX ADMIN — ACETAMINOPHEN 650 MG: 325 TABLET ORAL at 20:36

## 2025-07-22 RX ADMIN — LABETALOL HYDROCHLORIDE 200 MG: 200 TABLET, FILM COATED ORAL at 20:34

## 2025-07-22 RX ADMIN — LABETALOL HYDROCHLORIDE 10 MG: 5 INJECTION, SOLUTION INTRAVENOUS at 19:28

## 2025-07-22 RX ADMIN — GABAPENTIN 100 MG: 100 CAPSULE ORAL at 22:11

## 2025-07-22 RX ADMIN — IOHEXOL 85 ML: 350 INJECTION, SOLUTION INTRAVENOUS at 13:47

## 2025-07-22 NOTE — H&P
H&P - Hospitalist   Name: Fanta Bishop 85 y.o. female I MRN: [id number]  Unit/Bed#: ED 16 I Date of Admission: 7/22/2025   Date of Service: 7/22/2025 I Hospital Day: 0     Assessment & Plan  Pleural effusion  Patient presents with shortness of breath and dyspnea on exertion  No hypoxia  CTA negative for acute PE  Found to have large right-sided pleural effusion  Unclear etiology  Pulmonary consulted for possible thoracentesis  Continue to monitor respiratory status  Afebrile and hemodynamically stable.  No complaints of cough or productive sputum  History of DVT of lower extremity  Continue with Xarelto  Neuropathy  Continue with gabapentin  Hypertensive urgency  Blood pressure significantly elevated  Resume oral hypertensive regimen  Labetalol IV as needed      VTE Pharmacologic Prophylaxis:   Xarelto  Code Status: Level 1 - Full Code   Discussion with family: Updated  (daughter) at bedside.    Anticipated Length of Stay: Patient will be admitted on an inpatient basis with an anticipated length of stay of greater than 2 midnights secondary to pleural effusion.    History of Present Illness   Chief Complaint: Shortness of breath    Fanta Bishop is a 85 y.o. female with a PMH of DVT, hypertension, iron deficiency anemia, congestive heart failure, neuropathy, osteoarthritis who presents with complaints of progressive shortness of breath and dyspnea on exertion.    Patient reports that her symptoms started about 3 days ago and have been worsening.  She is now unable to lie flat due to her shortness of breath.  She reports mild cough but denies any productive sputum.  No reported fevers, chills, nausea, vomiting, chest pain.  She remains compliant with her medications.  No reported new medications.  No sick contacts or travel history.    In the ER patient found to have a large right-sided pleural effusion.    Review of Systems  12 point review of systems is grossly negative unless stated above in  "HPI  Historical Information   Past Medical History[1]  Past Surgical History[2]  Social History[3]  E-Cigarette/Vaping    E-Cigarette Use Never User      E-Cigarette/Vaping Substances    Nicotine No     THC No     CBD No     Flavoring No     Other No     Unknown No      Family history non-contributory  Social History:  Marital Status:    Patient Pre-hospital Living Situation: Home    Meds/Allergies   I have reviewed home medications with patient personally.  Prior to Admission medications    Medication Sig Start Date End Date Taking? Authorizing Provider   amLODIPine-atorvastatin (CADUET) 10-40 MG per tablet Take 1 tablet by mouth in the morning.    Historical Provider, MD   Calcium Carbonate-Vit D-Min (CALCIUM 1200 PO) Take by mouth    Historical Provider, MD   famotidine (PEPCID) 40 MG tablet Take 40 mg by mouth    Historical Provider, MD   furosemide (LASIX) 40 mg tablet Take 40 mg by mouth Every other day 2/24/25   Historical Provider, MD   gabapentin (NEURONTIN) 100 mg capsule Take 1 capsule (100 mg total) by mouth daily 1/11/19   Andres Ortiz MD   labetalol (NORMODYNE) 100 mg tablet Take 2 tablets (200 mg total) by mouth every 12 (twelve) hours 5/10/25   MAITE Matson   olmesartan-hydrochlorothiazide (BENICAR HCT) 40-12.5 MG per tablet Take 1 tablet by mouth if needed 4/18/25   Historical Provider, MD   oxybutynin (DITROPAN) 5 mg tablet Take 5 mg by mouth in the morning and 5 mg in the evening. 2/24/25   Historical Provider, MD   potassium chloride (Klor-Con) 10 mEq tablet Take 10 mEq by mouth 4/9/25   Historical Provider, MD   rivaroxaban (XARELTO) 20 mg tablet Take 1 tablet (20 mg total) by mouth daily with breakfast 1/11/19   Andres Ortiz MD     Allergies   Allergen Reactions    Codeine      \"made me pass out\"       Objective :  Temp:  [97.6 °F (36.4 °C)-98.2 °F (36.8 °C)] 98.2 °F (36.8 °C)  HR:  [64-90] 86  BP: (189-231)/() 204/96  Resp:  [16-36] 17  SpO2:  [94 %-97 %] 96 " %  O2 Device: None (Room air)    Physical Exam   Gen: elderly female in no apparent distress  HEENT: EOMI, MMM  Resp: Reduced breath sounds on right side  CV: s1s2, RRR  Abd: Soft, NT, ND, BS+  MSK: FROM  Skin: No rashes  Neuro: No acute focal deficits  Psych: Mood/affect WNL        Lab Results: I have reviewed the following results:  Results from last 7 days   Lab Units 07/22/25  1123   WBC Thousand/uL 9.50   HEMOGLOBIN g/dL 11.1*   HEMATOCRIT % 35.3   PLATELETS Thousands/uL 215   SEGS PCT % 81*   LYMPHO PCT % 12*   MONO PCT % 5   EOS PCT % 2     Results from last 7 days   Lab Units 07/22/25  1123   SODIUM mmol/L 142   POTASSIUM mmol/L 5.1   CHLORIDE mmol/L 109*   CO2 mmol/L 27   BUN mg/dL 14   CREATININE mg/dL 0.81   ANION GAP mmol/L 6   CALCIUM mg/dL 8.9   ALBUMIN g/dL 4.1   TOTAL BILIRUBIN mg/dL 0.67   ALK PHOS U/L 83   ALT U/L 22   AST U/L 17   GLUCOSE RANDOM mg/dL 126             Lab Results   Component Value Date    HGBA1C 5.4 09/01/2020           Imaging Results Review: I reviewed radiology reports from this admission including: xray(s).  Other Study Results Review: EKG was reviewed.     Administrative Statements   I have spent a total time of 48 minutes in caring for this patient on the day of the visit/encounter including Diagnostic results, Impressions, Counseling / Coordination of care, Documenting in the medical record, Reviewing/placing orders in the medical record (including tests, medications, and/or procedures), Obtaining or reviewing history  , and Communicating with other healthcare professionals .    ** Please Note: This note has been constructed using a voice recognition system. **         [1]   Past Medical History:  Diagnosis Date    Anemia     Cardiac disease     Cervical spondylolysis     Cervical stenosis of spine     CHF (congestive heart failure) (HCC)     DVT (deep venous thrombosis) (HCC)     Hypertension     Neuropathy     from fall accident    No blood products     Osteoarthritis     [2]   Past Surgical History:  Procedure Laterality Date    BACK SURGERY      CARDIAC PACEMAKER PLACEMENT     [3]   Social History  Tobacco Use    Smoking status: Never    Smokeless tobacco: Never   Vaping Use    Vaping status: Never Used   Substance and Sexual Activity    Alcohol use: No    Drug use: No

## 2025-07-22 NOTE — ASSESSMENT & PLAN NOTE
Patient presents with shortness of breath and dyspnea on exertion  No hypoxia  CTA negative for acute PE  Found to have large right-sided pleural effusion  Unclear etiology  Pulmonary consulted for possible thoracentesis  Continue to monitor respiratory status  Afebrile and hemodynamically stable.  No complaints of cough or productive sputum

## 2025-07-22 NOTE — ED PROVIDER NOTES
Time reflects when diagnosis was documented in both MDM as applicable and the Disposition within this note       Time User Action Codes Description Comment    7/22/2025  4:43 PM Willie Ovalle Add [J90] Pleural effusion     7/22/2025  4:44 PM Willie Ovalle Add [D49.1] Lung tumor     7/22/2025  4:44 PM KrivleifiaCesarh Add [I31.39] Pericardial effusion     7/22/2025  4:44 PM Cesar Ovalleh Add [I10] HTN (hypertension)           ED Disposition       ED Disposition   Admit    Condition   Stable    Date/Time   Tue Jul 22, 2025  4:44 PM    Comment   Case was discussed with Dr. Briceno and the patient's admission status was agreed to be Admission Status: inpatient status to the service of Dr. Briceno .               Assessment & Plan       Medical Decision Making  85-year-old female with history of hypertension and DVT presents today for evaluation of 3 days of dyspnea on exertion and orthopnea.  Patient is brought in by her daughter who notes patient is on Xarelto for history of prior DVT but missed her doses for the past 3 days because she and obviously go to the pharmacy to  her prescription.    Differential: PE, pleural effusion, pneumonia, pneumothorax, ACS    Plan: Will plan to obtain CBC, CMP, troponin, BNP, D-dimer, EKG, and chest x-ray.    Patient's labs are notable for elevated D-dimer and slightly elevated BNP.  Her chest x-ray is notable for right pleural effusion and right lower lobe atelectasis.  Given elevated D-dimer will plan to obtain CT PE study.  Patient CT showing no acute PE but does redemonstrate large right pleural effusion with significant atelectasis, small pericardial effusion, and known right upper lobe tumor.  Discussed these findings with the patient and daughter at bedside.  Explained that patient will be admitted for further management of her effusion.  They are agreeable to this plan.  Patient's case discussed with the hospitalist who agrees to admit the patient.    Amount  and/or Complexity of Data Reviewed  Labs: ordered. Decision-making details documented in ED Course.  Radiology: ordered and independent interpretation performed.    Risk  Prescription drug management.  Decision regarding hospitalization.        ED Course as of 07/22/25 1721 Tue Jul 22, 2025   1051 This EKG interpreted by me. Rate 69, rhythm normal sinus, right axis, intervals normal, no acute ST or T wave changes     1135 Hemoglobin(!): 11.1   1204 hs TnI 0hr: 8   1219 BNP(!): 224       Medications   iohexol (OMNIPAQUE) 350 MG/ML injection (MULTI-DOSE) 85 mL (85 mL Intravenous Given 7/22/25 1347)       ED Risk Strat Scores   HEART Risk Score      Flowsheet Row Most Recent Value   Heart Score Risk Calculator    History 0 Filed at: 07/22/2025 1721   ECG 0 Filed at: 07/22/2025 1721   Age 2 Filed at: 07/22/2025 1721   Risk Factors 1 Filed at: 07/22/2025 1721   Troponin 0 Filed at: 07/22/2025 1721   HEART Score 3 Filed at: 07/22/2025 1721          HEART Risk Score      Flowsheet Row Most Recent Value   Heart Score Risk Calculator    History 0 Filed at: 07/22/2025 1721   ECG 0 Filed at: 07/22/2025 1721   Age 2 Filed at: 07/22/2025 1721   Risk Factors 1 Filed at: 07/22/2025 1721   Troponin 0 Filed at: 07/22/2025 1721   HEART Score 3 Filed at: 07/22/2025 1721                      No data recorded                            History of Present Illness       Chief Complaint   Patient presents with    Shortness of Breath     Reports she has been feeling SOB for a few days, takes xarelto but ran out and didn't take it for 3 days, hx of DVT        Past Medical History[1]   Past Surgical History[2]   Family History[3]   Social History[4]   E-Cigarette/Vaping    E-Cigarette Use Never User       E-Cigarette/Vaping Substances    Nicotine No     THC No     CBD No     Flavoring No     Other No     Unknown No       I have reviewed and agree with the history as documented.     Patient is an 85-year-old female with history of  hypertension and DVT presenting today for evaluation of shortness of breath.  Patient is brought in by her daughter who aids in history.  Patient states that for the past 3 days she has been having progressively worsening shortness of breath with exertion and with lying flat.  She also endorses a cough productive of clear sputum.  She states that she is on Xarelto for prior DVT but ran out 3 days ago and has been unable to fill her prescription.  She also endorses some mild chest tightness associate with the shortness of breath.  She denies any fevers, chills, palpitations, abdominal pain, nausea, vomiting, urinary or bowel habit changes, or leg swelling.  She denies any other PE or DVT risk factors.        Review of Systems   Constitutional:  Negative for chills and fever.   HENT:  Negative for congestion, rhinorrhea and sore throat.    Eyes:  Negative for pain and visual disturbance.   Respiratory:  Positive for cough, chest tightness and shortness of breath.    Cardiovascular:  Negative for chest pain and palpitations.   Gastrointestinal:  Negative for abdominal pain, constipation, diarrhea, nausea and vomiting.   Genitourinary:  Negative for dysuria and hematuria.   Musculoskeletal:  Negative for back pain and neck pain.   Skin:  Negative for color change and rash.   Neurological:  Negative for weakness and numbness.   All other systems reviewed and are negative.          Objective       ED Triage Vitals   Temperature Pulse Blood Pressure Respirations SpO2 Patient Position - Orthostatic VS   07/22/25 1028 07/22/25 1028 07/22/25 1028 07/22/25 1028 07/22/25 1028 07/22/25 1028   97.6 °F (36.4 °C) 64 (!) 189/86 16 94 % Sitting      Temp Source Heart Rate Source BP Location FiO2 (%) Pain Score    07/22/25 1028 07/22/25 1028 07/22/25 1028 -- 07/22/25 1445    Temporal Monitor Left arm  No Pain      Vitals      Date and Time Temp Pulse SpO2 Resp BP Pain Score FACES Pain Rating User   07/22/25 1700 -- 90 96 % 18 195/95  -- -- MB   07/22/25 1630 98.2 °F (36.8 °C) 87 95 % 19  231/182 provider aware and patient referred to [facility] No Pain -- MB   07/22/25 1600 -- 85 97 % 36  222/100 provider aware and at bedside -- -- MB   07/22/25 1445 -- 77 97 % 19  210/104 provider informed No Pain -- MB   07/22/25 1400 -- 75 94 % 20  215/80 provider made aware -- -- MB   07/22/25 1230 -- 68 95 % 34 206/98 -- -- MB   07/22/25 1222 -- 67 95 % 20 205/101 -- --    07/22/25 1028 97.6 °F (36.4 °C) 64 94 % 16 189/86 -- -- FB            Physical Exam  Vitals and nursing note reviewed.   Constitutional:       General: She is not in acute distress.     Appearance: Normal appearance. She is well-developed. She is obese. She is not ill-appearing, toxic-appearing or diaphoretic.   HENT:      Head: Normocephalic and atraumatic.      Right Ear: External ear normal.      Left Ear: External ear normal.      Nose: Nose normal. No congestion or rhinorrhea.      Mouth/Throat:      Mouth: Mucous membranes are moist.     Eyes:      General: No scleral icterus.        Right eye: No discharge.         Left eye: No discharge.      Conjunctiva/sclera: Conjunctivae normal.       Cardiovascular:      Rate and Rhythm: Normal rate and regular rhythm.      Pulses: Normal pulses.      Heart sounds: Normal heart sounds.   Pulmonary:      Effort: Pulmonary effort is normal. Tachypnea present. No accessory muscle usage or respiratory distress.      Breath sounds: No stridor. Examination of the right-middle field reveals decreased breath sounds. Examination of the right-lower field reveals decreased breath sounds. Decreased breath sounds present. No wheezing, rhonchi or rales.   Abdominal:      General: Abdomen is flat.      Palpations: Abdomen is soft.      Tenderness: There is no abdominal tenderness. There is no guarding or rebound.     Musculoskeletal:         General: Normal range of motion.      Cervical back: Normal range of motion and neck supple.      Right lower leg: No  tenderness. No edema.      Left lower leg: No tenderness. No edema.     Skin:     General: Skin is warm and dry.      Capillary Refill: Capillary refill takes less than 2 seconds.      Coloration: Skin is not jaundiced or pale.     Neurological:      General: No focal deficit present.      Mental Status: She is alert and oriented to person, place, and time.     Psychiatric:         Mood and Affect: Mood normal.         Behavior: Behavior normal.         Results Reviewed       Procedure Component Value Units Date/Time    HS Troponin I 2hr [[id number]]  (Normal) Collected: 07/22/25 1328    Lab Status: Final result Specimen: Blood from Arm, Right Updated: 07/22/25 1356     hs TnI 2hr 8 ng/L      Delta 2hr hsTnI 0 ng/L     D-dimer, quantitative [[id number]]  (Abnormal) Collected: 07/22/25 1145    Lab Status: Final result Specimen: Blood from Arm, Right Updated: 07/22/25 1223     D-Dimer, Quant 2.88 ug/ml FEU     Narrative:      In the evaluation for possible pulmonary embolism, in the appropriate (Well's Score of 4 or less) patient, the age adjusted d-dimer cutoff for this patient can be calculated as:    Age x 0.01 (in ug/mL) for Age-adjusted D-dimer exclusion threshold for a patient over 50 years.    B-Type Natriuretic Peptide(BNP) [[id number]]  (Abnormal) Collected: 07/22/25 1145    Lab Status: Final result Specimen: Blood from Arm, Right Updated: 07/22/25 1213      pg/mL     HS Troponin 0hr (reflex protocol) [[id number]]  (Normal) Collected: 07/22/25 1123    Lab Status: Final result Specimen: Blood from Arm, Right Updated: 07/22/25 1152     hs TnI 0hr 8 ng/L     Comprehensive metabolic panel [[id number]]  (Abnormal) Collected: 07/22/25 1123    Lab Status: Final result Specimen: Blood from Arm, Right Updated: 07/22/25 1144     Sodium 142 mmol/L      Potassium 5.1 mmol/L      Chloride 109 mmol/L      CO2 27 mmol/L      ANION GAP 6 mmol/L      BUN 14 mg/dL      Creatinine 0.81 mg/dL      Glucose 126 mg/dL       Calcium 8.9 mg/dL      AST 17 U/L      ALT 22 U/L      Alkaline Phosphatase 83 U/L      Total Protein 7.9 g/dL      Albumin 4.1 g/dL      Total Bilirubin 0.67 mg/dL      eGFR 65 ml/min/1.73sq m     Narrative:      [employer] guidelines for Chronic Kidney Disease (CKD):     Stage 1 with normal or high GFR (GFR > 90 mL/min/1.73 square meters)    Stage 2 Mild CKD (GFR = 60-89 mL/min/1.73 square meters)    Stage 3A Moderate CKD (GFR = 45-59 mL/min/1.73 square meters)    Stage 3B Moderate CKD (GFR = 30-44 mL/min/1.73 square meters)    Stage 4 Severe CKD (GFR = 15-29 mL/min/1.73 square meters)    Stage 5 End Stage CKD (GFR <15 mL/min/1.73 square meters)  Note: GFR calculation is accurate only with a steady state creatinine    CBC and differential [[id number]]  (Abnormal) Collected: 07/22/25 1123    Lab Status: Final result Specimen: Blood from Arm, Right Updated: 07/22/25 1130     WBC 9.50 Thousand/uL      RBC 4.51 Million/uL      Hemoglobin 11.1 g/dL      Hematocrit 35.3 %      MCV 78 fL      MCH 24.6 pg      MCHC 31.4 g/dL      RDW 20.3 %      MPV 10.8 fL      Platelets 215 Thousands/uL      nRBC 0 /100 WBCs      Segmented % 81 %      Immature Grans % 0 %      Lymphocytes % 12 %      Monocytes % 5 %      Eosinophils Relative 2 %      Basophils Relative 0 %      Absolute Neutrophils 7.64 Thousands/µL      Absolute Immature Grans 0.04 Thousand/uL      Absolute Lymphocytes 1.14 Thousands/µL      Absolute Monocytes 0.43 Thousand/µL      Eosinophils Absolute 0.23 Thousand/µL      Basophils Absolute 0.02 Thousands/µL             CTA chest pe study   Final Interpretation by Pardeep Donnelly MD (07/22 1511)      1.  No pulmonary embolism.   2.  Large right pleural effusion with significant right middle lobe and complete right lower lobe passive atelectasis.   3.  Small pericardial effusion.   4.  Known right upper lobe tumor.   5.  Partially imaged possible left hydronephrosis.            Computerized  Assisted Algorithm (CAA) may have aided analysis of applicable images.         Resident: JOSLYN CRAFT I, the attending radiologist, have reviewed the images and agree with the final report above.      Workstation performed: [id number]         XR chest 1 view portable   ED Interpretation by Willie Ovalle MD (07/22 1117)   Abnormal   Cardiomegaly with right-sided pleural effusion      Final Interpretation by Mata King MD (07/22 1517)      Large right pleural effusion and right lower lobe atelectasis.            Workstation performed: [id number]             Procedures    ED Medication and Procedure Management   Prior to Admission Medications   Prescriptions Last Dose Informant Patient Reported? Taking?   Calcium Carbonate-Vit D-Min (CALCIUM 1200 PO)   Yes No   Sig: Take by mouth   amLODIPine-atorvastatin (CADUET) 10-40 MG per tablet   Yes No   Sig: Take 1 tablet by mouth in the morning.   famotidine (PEPCID) 40 MG tablet   Yes No   Sig: Take 40 mg by mouth   furosemide (LASIX) 40 mg tablet   Yes No   Sig: Take 40 mg by mouth Every other day   gabapentin (NEURONTIN) 100 mg capsule   No No   Sig: Take 1 capsule (100 mg total) by mouth daily   labetalol (NORMODYNE) 100 mg tablet   No No   Sig: Take 2 tablets (200 mg total) by mouth every 12 (twelve) hours   olmesartan-hydrochlorothiazide (BENICAR HCT) 40-12.5 MG per tablet   Yes No   Sig: Take 1 tablet by mouth if needed   oxybutynin (DITROPAN) 5 mg tablet   Yes No   Sig: Take 5 mg by mouth in the morning and 5 mg in the evening.   potassium chloride (Klor-Con) 10 mEq tablet   Yes No   Sig: Take 10 mEq by mouth   rivaroxaban (XARELTO) 20 mg tablet   No No   Sig: Take 1 tablet (20 mg total) by mouth daily with breakfast      Facility-Administered Medications: None     Patient's Medications   Discharge Prescriptions    No medications on file     No discharge procedures on file.  ED SEPSIS DOCUMENTATION   Time reflects when diagnosis was documented in  both MDM as applicable and the Disposition within this note       Time User Action Codes Description Comment    7/22/2025  4:43 PM Willie Ovalle Add [J90] Pleural effusion     7/22/2025  4:44 PM Willie Ovalle Add [D49.1] Lung tumor     7/22/2025  4:44 PM Willie Ovalle Add [I31.39] Pericardial effusion     7/22/2025  4:44 PM Willie Ovalle Add [I10] HTN (hypertension)                    [1]   Past Medical History:  Diagnosis Date    Anemia     Cardiac disease     Cervical spondylolysis     Cervical stenosis of spine     CHF (congestive heart failure) (HCC)     DVT (deep venous thrombosis) (HCC)     Hypertension     Neuropathy     from fall accident    No blood products     Osteoarthritis    [2]   Past Surgical History:  Procedure Laterality Date    BACK SURGERY      CARDIAC PACEMAKER PLACEMENT     [3] No family history on file.  [4]   Social History  Tobacco Use    Smoking status: Never    Smokeless tobacco: Never   Vaping Use    Vaping status: Never Used   Substance Use Topics    Alcohol use: No    Drug use: No        Willie Ovalle MD  07/22/25 7419

## 2025-07-22 NOTE — ED NOTES
EKG in triage now registering for this patient, will be brought to ED room to complete the EKG.      Petra Looney  07/22/25 1038

## 2025-07-22 NOTE — ED NOTES
Patient BP been monitored every half hour. Vitals are documented and provider has been notified by this RN.     Daija Hernandez RN  07/22/25 2654

## 2025-07-23 ENCOUNTER — APPOINTMENT (INPATIENT)
Dept: NON INVASIVE DIAGNOSTICS | Facility: HOSPITAL | Age: 85
End: 2025-07-23
Attending: NURSE PRACTITIONER
Payer: COMMERCIAL

## 2025-07-23 PROBLEM — R91.8 LUNG MASS: Status: ACTIVE | Noted: 2025-07-23

## 2025-07-23 LAB
ALBUMIN SERPL BCG-MCNC: 3.8 G/DL (ref 3.5–5)
ALP SERPL-CCNC: 84 U/L (ref 34–104)
ALT SERPL W P-5'-P-CCNC: 17 U/L (ref 7–52)
ANION GAP SERPL CALCULATED.3IONS-SCNC: 9 MMOL/L (ref 4–13)
APPEARANCE FLD: ABNORMAL
AST SERPL W P-5'-P-CCNC: 13 U/L (ref 13–39)
BASOPHILS # BLD AUTO: 0.04 THOUSANDS/ÂΜL (ref 0–0.1)
BASOPHILS NFR BLD AUTO: 0 % (ref 0–1)
BILIRUB SERPL-MCNC: 0.68 MG/DL (ref 0.2–1)
BUN SERPL-MCNC: 14 MG/DL (ref 5–25)
CALCIUM SERPL-MCNC: 8.8 MG/DL (ref 8.4–10.2)
CHLORIDE SERPL-SCNC: 108 MMOL/L (ref 96–108)
CO2 SERPL-SCNC: 25 MMOL/L (ref 21–32)
COLOR FLD: ABNORMAL
CREAT SERPL-MCNC: 0.75 MG/DL (ref 0.6–1.3)
EOSINOPHIL # BLD AUTO: 0.14 THOUSAND/ÂΜL (ref 0–0.61)
EOSINOPHIL NFR BLD AUTO: 1 % (ref 0–6)
ERYTHROCYTE [DISTWIDTH] IN BLOOD BY AUTOMATED COUNT: 20.1 % (ref 11.6–15.1)
GFR SERPL CREATININE-BSD FRML MDRD: 72 ML/MIN/1.73SQ M
GLUCOSE FLD-MCNC: 114 MG/DL
GLUCOSE SERPL-MCNC: 101 MG/DL (ref 65–140)
HCT VFR BLD AUTO: 36.9 % (ref 34.8–46.1)
HGB BLD-MCNC: 11.4 G/DL (ref 11.5–15.4)
HISTIOCYTES NFR FLD: 40 %
IMM GRANULOCYTES # BLD AUTO: 0.03 THOUSAND/UL (ref 0–0.2)
IMM GRANULOCYTES NFR BLD AUTO: 0 % (ref 0–2)
INR PPP: 1.94 (ref 0.85–1.19)
LDH FLD L TO P-CCNC: 342 U/L
LYMPHOCYTES # BLD AUTO: 1.31 THOUSANDS/ÂΜL (ref 0.6–4.47)
LYMPHOCYTES NFR BLD AUTO: 13 % (ref 14–44)
LYMPHOCYTES NFR BLD AUTO: 21 %
MAGNESIUM SERPL-MCNC: 2.3 MG/DL (ref 1.9–2.7)
MCH RBC QN AUTO: 24 PG (ref 26.8–34.3)
MCHC RBC AUTO-ENTMCNC: 30.9 G/DL (ref 31.4–37.4)
MCV RBC AUTO: 78 FL (ref 82–98)
MONO+MESO NFR FLD MANUAL: 7 %
MONOCYTES # BLD AUTO: 0.61 THOUSAND/ÂΜL (ref 0.17–1.22)
MONOCYTES NFR BLD AUTO: 6 % (ref 4–12)
NEUTROPHILS # BLD AUTO: 7.68 THOUSANDS/ÂΜL (ref 1.85–7.62)
NEUTS SEG NFR BLD AUTO: 32 %
NEUTS SEG NFR BLD AUTO: 80 % (ref 43–75)
NRBC BLD AUTO-RTO: 0 /100 WBCS
PH BODY FLUID: 7.6
PLATELET # BLD AUTO: 234 THOUSANDS/UL (ref 149–390)
PMV BLD AUTO: 11.5 FL (ref 8.9–12.7)
POTASSIUM SERPL-SCNC: 3.7 MMOL/L (ref 3.5–5.3)
PROT FLD-MCNC: 5.3 G/DL
PROT SERPL-MCNC: 7.5 G/DL (ref 6.4–8.4)
PROTHROMBIN TIME: 22.9 SECONDS (ref 12.3–15)
RBC # BLD AUTO: 4.75 MILLION/UL (ref 3.81–5.12)
SITE: ABNORMAL
SODIUM SERPL-SCNC: 142 MMOL/L (ref 135–147)
TOTAL CELLS COUNTED SPEC: 100
WBC # BLD AUTO: 9.81 THOUSAND/UL (ref 4.31–10.16)
WBC # FLD MANUAL: 1029 /UL

## 2025-07-23 PROCEDURE — 0W993ZZ DRAINAGE OF RIGHT PLEURAL CAVITY, PERCUTANEOUS APPROACH: ICD-10-PCS | Performed by: RADIOLOGY

## 2025-07-23 PROCEDURE — 32555 ASPIRATE PLEURA W/ IMAGING: CPT | Performed by: NURSE PRACTITIONER

## 2025-07-23 PROCEDURE — 82945 GLUCOSE OTHER FLUID: CPT | Performed by: PHYSICIAN ASSISTANT

## 2025-07-23 PROCEDURE — 89051 BODY FLUID CELL COUNT: CPT | Performed by: PHYSICIAN ASSISTANT

## 2025-07-23 PROCEDURE — 87205 SMEAR GRAM STAIN: CPT | Performed by: PHYSICIAN ASSISTANT

## 2025-07-23 PROCEDURE — 84157 ASSAY OF PROTEIN OTHER: CPT | Performed by: PHYSICIAN ASSISTANT

## 2025-07-23 PROCEDURE — 88305 TISSUE EXAM BY PATHOLOGIST: CPT | Performed by: PATHOLOGY

## 2025-07-23 PROCEDURE — 85025 COMPLETE CBC W/AUTO DIFF WBC: CPT | Performed by: INTERNAL MEDICINE

## 2025-07-23 PROCEDURE — 97167 OT EVAL HIGH COMPLEX 60 MIN: CPT

## 2025-07-23 PROCEDURE — 88342 IMHCHEM/IMCYTCHM 1ST ANTB: CPT | Performed by: PATHOLOGY

## 2025-07-23 PROCEDURE — 83986 ASSAY PH BODY FLUID NOS: CPT | Performed by: PHYSICIAN ASSISTANT

## 2025-07-23 PROCEDURE — 80053 COMPREHEN METABOLIC PANEL: CPT | Performed by: INTERNAL MEDICINE

## 2025-07-23 PROCEDURE — 88112 CYTOPATH CELL ENHANCE TECH: CPT | Performed by: PATHOLOGY

## 2025-07-23 PROCEDURE — 97163 PT EVAL HIGH COMPLEX 45 MIN: CPT

## 2025-07-23 PROCEDURE — 88341 IMHCHEM/IMCYTCHM EA ADD ANTB: CPT | Performed by: PATHOLOGY

## 2025-07-23 PROCEDURE — 83735 ASSAY OF MAGNESIUM: CPT | Performed by: INTERNAL MEDICINE

## 2025-07-23 PROCEDURE — 99232 SBSQ HOSP IP/OBS MODERATE 35: CPT | Performed by: NURSE PRACTITIONER

## 2025-07-23 PROCEDURE — 85610 PROTHROMBIN TIME: CPT | Performed by: NURSE PRACTITIONER

## 2025-07-23 PROCEDURE — 97110 THERAPEUTIC EXERCISES: CPT

## 2025-07-23 PROCEDURE — 87070 CULTURE OTHR SPECIMN AEROBIC: CPT | Performed by: PHYSICIAN ASSISTANT

## 2025-07-23 PROCEDURE — 83615 LACTATE (LD) (LDH) ENZYME: CPT | Performed by: PHYSICIAN ASSISTANT

## 2025-07-23 PROCEDURE — 99223 1ST HOSP IP/OBS HIGH 75: CPT | Performed by: INTERNAL MEDICINE

## 2025-07-23 PROCEDURE — 32555 ASPIRATE PLEURA W/ IMAGING: CPT

## 2025-07-23 RX ORDER — LIDOCAINE WITH 8.4% SOD BICARB 0.9%(10ML)
SYRINGE (ML) INJECTION AS NEEDED
Status: COMPLETED | OUTPATIENT
Start: 2025-07-23 | End: 2025-07-23

## 2025-07-23 RX ADMIN — LOSARTAN POTASSIUM 100 MG: 50 TABLET, FILM COATED ORAL at 08:32

## 2025-07-23 RX ADMIN — LABETALOL HYDROCHLORIDE 200 MG: 200 TABLET, FILM COATED ORAL at 08:32

## 2025-07-23 RX ADMIN — LABETALOL HYDROCHLORIDE 200 MG: 200 TABLET, FILM COATED ORAL at 22:14

## 2025-07-23 RX ADMIN — RIVAROXABAN 20 MG: 20 TABLET, FILM COATED ORAL at 08:32

## 2025-07-23 RX ADMIN — OXYBUTYNIN CHLORIDE 5 MG: 5 TABLET ORAL at 18:55

## 2025-07-23 RX ADMIN — Medication 10 ML: at 14:07

## 2025-07-23 RX ADMIN — DOCUSATE SODIUM 100 MG: 100 CAPSULE, LIQUID FILLED ORAL at 18:55

## 2025-07-23 RX ADMIN — FUROSEMIDE 40 MG: 40 TABLET ORAL at 08:32

## 2025-07-23 RX ADMIN — DOCUSATE SODIUM 100 MG: 100 CAPSULE, LIQUID FILLED ORAL at 08:32

## 2025-07-23 RX ADMIN — AMLODIPINE BESYLATE 10 MG: 10 TABLET ORAL at 08:32

## 2025-07-23 RX ADMIN — ATORVASTATIN CALCIUM 40 MG: 40 TABLET, FILM COATED ORAL at 08:32

## 2025-07-23 RX ADMIN — GABAPENTIN 100 MG: 100 CAPSULE ORAL at 22:14

## 2025-07-23 NOTE — PLAN OF CARE
Problem: OCCUPATIONAL THERAPY ADULT  Goal: Performs self-care activities at highest level of function for planned discharge setting.  See evaluation for individualized goals.  Description: Treatment Interventions: ADL retraining, Functional transfer training, UE strengthening/ROM, Endurance training, Patient/family training, Compensatory technique education, Energy conservation, Activityengagement          See flowsheet documentation for full assessment, interventions and recommendations.   Note: Limitation: Decreased ADL status, Decreased UE strength, Decreased Safe judgement during ADL, Decreased endurance, Decreased self-care trans, Decreased high-level ADLs  Prognosis: Good  Assessment: Pt is a 85 y.o. female seen for OT evaluation s/p admit to [facility] on 7/22/2025 w/ Pleural effusion.  Comorbidities affecting pt's functional performance at time of assessment include:  Hx DVT, neuropathy, HTN, lung mass, difficulty swallowing, iron deficiency anemia . Personal factors affecting pt at time of IE include:steps to enter environment, difficulty performing ADLS, and difficulty performing IADLS . OT order placed to assess Pt's ADLs, cognitive status, and performance during functional tasks in order to maximize safety and independence while making most appropriate d/c recommendations. Prior to admission, pt was receiving A with adls, iadls, and I with mobility using RW. Upon evaluation: the following deficits impact occupational performance: weakness, decreased strength, decreased balance, decreased tolerance, impaired sensation, and decreased safety awareness. Pt's clinical presentation is currently stable  given new onset deficits that effect Pt's occupational performance and ability to safely return to OF including decrease activity tolerance, decrease standing balance, decrease performance during ADL tasks, decrease safety awareness , decrease generalized strength, and decrease performance during functional  transfers combined with medical complications of need for input for mobility technique/safety.  Pt to benefit from continued skilled OT tx while in the hospital to address deficits as defined above and maximize level of functional independence w ADL's and functional mobility. Occupational Performance areas to address include: bathing/shower, toilet hygiene, dressing, functional mobility, and clothing management. From OT standpoint, recommendation at time of d/c would be post acute rehab services.      ADLs based on functional performance during eval.     Rehab Resource Intensity Level, OT: II (Moderate Resource Intensity)     Hung MARTI OTR/L      Initial Triglycerides (Optional): 122, AST-17, ALT- 27 Patient Reported Weight (Optional - Include Units): 198 Ipledge Number (Optional): [id number] Detail Level: Zone

## 2025-07-23 NOTE — ASSESSMENT & PLAN NOTE
Patient presents with shortness of breath and dyspnea on exertion  No hypoxia  CTA negative for acute PE  Found to have large right-sided pleural effusion  Unclear etiology  Pulmonary consulted   Concern for possible metastasis  IR consulted for thoracentesis  Continue to monitor respiratory status  Afebrile and hemodynamically stable.  No complaints of cough or productive sputum

## 2025-07-23 NOTE — BRIEF OP NOTE (RAD/CATH)
IR THORACENTESIS Procedure Note    PATIENT NAME: Fanta Bishop  : 1940  MRN: [id number]    Pre-op Diagnosis:   1. Pleural effusion    2. Lung tumor    3. Pericardial effusion    4. HTN (hypertension)      Post-op Diagnosis:   1. Pleural effusion    2. Lung tumor    3. Pericardial effusion    4. HTN (hypertension)        Surgeon:   MAITE Moffett  Assistants:   None     Estimated Blood Loss: None     Findings: 1350 mL of dark sherman cloudy pleural fluid aspirated from right side, ultrasound-guided thoracentesis.    Specimens: Specimens obtained/sent as ordered    Complications: None immediate    Anesthesia: Local    MAITE Moffett     Date: 2025  Time: 3:08 PM

## 2025-07-23 NOTE — PLAN OF CARE
Problem: PHYSICAL THERAPY ADULT  Goal: Performs mobility at highest level of function for planned discharge setting.  See evaluation for individualized goals.  Description: Treatment/Interventions: Functional transfer training, LE strengthening/ROM, Elevations, Therapeutic exercise, Endurance training, Patient/family training, Equipment eval/education, Bed mobility, Gait training, Spoke to nursing, OT, Family          See flowsheet documentation for full assessment, interventions and recommendations.  Note: Prognosis: Good  Problem List: Decreased strength, Decreased endurance, Impaired balance, Decreased mobility, Impaired sensation  Assessment: Pt is 85 y.o. female evaluated by PT secondary to admission to [facility] due to pleural effusion. Co-morbidities affecting pt at this time include history of DVT of lower extremity, neuropathy, hypertensive emergency, and lung mass. Pt lives in a two story home with one step to enter. Pt is able to live on 1st floor with 1/2 bath, and goes up 1 flight of stairs to use the shower. Pt is previously independent in functional mobility and ADLs and pt's son assists in navigating steps to use the shower. Body systems were assessed as indicated, refer to flowsheet for details. Pt performed sit to stand transfer with mod A x 1, requiring tactile cues to facilitate hip extension as well as verbal cues to use armrests and then move hands to RW once standing. Pt ambulated 25' in room with mod A x 1 and RW, requiring tactile cues to maintain balance as well as verbal cues to increase foot clearance with R LE. Pt then performed stand to sit transfer with mod A x 1, requiring assistance in controlling descent into chair as well as verbal cues to utilize armrests. Pt presents with the following impairments: decreased strength, decreased endurance, impaired balance, and decreased mobility. These impairments limit pt's ability to be independent in bed mobility, independently  perform transfers, perform dynamic tasks in the household and community, navigate community distances, and navigate elevations, and negotiate uneven surfaces. AM-PAC basic mobility assessment was administered on initial evaluation with pt scoring 11/24. PT to recommend level 2 moderate resource intensity to address above listed impairments and return to PLOF. Pt to benefit from PT.  Barriers to Discharge: Inaccessible home environment, Other (Comment), Decreased caregiver support (Decline in functional mobility)     Rehab Resource Intensity Level, PT: II (Moderate Resource Intensity)    See flowsheet documentation for full assessment.

## 2025-07-23 NOTE — ASSESSMENT & PLAN NOTE
Patient with complaint of shortness of breath found to have large right-sided pleural effusion with significant right middle lobe and complete right lower lobe atelectasis  Suspect this pleural effusion is malignant in nature given high suspicion for underlying progressive lung malignancy that has not been biopsied or treated  BNP is also elevated although feel less likely that CHF is the culprit of this pleural effusion    Thoracentesis has been ordered, fluid studies ordered  Patient has been following at [facility] hematology and radiation oncology

## 2025-07-23 NOTE — PHYSICAL THERAPY NOTE
Physical Therapy Evaluation     Patient's Name: Fanta [facility]    Admitting Diagnosis  Pericardial effusion [I31.39]  HTN (hypertension) [I10]  SOB (shortness of breath) [R06.02]  Pleural effusion [J90]  Lung tumor [D49.1]    Problem List  Problem List[1]  Past Medical History  Past Medical History[2]  Past Surgical History  Past Surgical History[3]     07/23/25 1105   PT Last Visit   PT Visit Date 07/23/25   Note Type   Note type Evaluation and Treatment   Pain Assessment   Pain Assessment Tool 0-10   Pain Score No Pain   Restrictions/Precautions   Weight Bearing Precautions Per Order No   Braces or Orthoses Other (Comment)  (none per pt)   Other Precautions Chair Alarm;Bed Alarm;Fall Risk;Telemetry;Multiple lines   Home Living   Type of Home House   Home Layout Two level;Able to live on main level with bedroom/bathroom;Stairs to enter with rails;1/2 bath on main level  (1 BERNARDO, Pt performs ADLs on main level except for shower located on 2nd floor; 1 flight upstairs. Pt reports mostly sleeping in recliner.)   Bathroom Shower/Tub Tub/shower unit   Bathroom Toilet Standard   Bathroom Equipment Shower chair   Bathroom Accessibility Accessible   Home Equipment Walker  (rollator)   Additional Comments Ambulatory with walker   Prior Function   Level of [place] Independent with ADLs;Independent with functional mobility;Needs assistance with IADLS  (Son assists to get upstairs to shower)   Lives With Son;Other (Comment)  (Daughter in law)   Receives Help From Family  (Son, Daughter)   IADLs Family/Friend/Other provides meals;Independent with medication management;Family/Friend/Other provides transportation   Falls in the last 6 months 0   Vocational Retired   General   Family/Caregiver Present Yes  (Pt's daughter arrived at end of session)   Cognition   Overall Cognitive Status WFL   Arousal/Participation Alert   Orientation Level Oriented X4   Memory Within functional limits   Following Commands Follows all commands  "and directions without difficulty   Comments Pt agreeable to PT   Subjective   Subjective \"It took two men to move me.\"   RLE Assessment   RLE Assessment X   Strength RLE   R Hip Flexion 3+/5   R Knee Extension 4/5   R Ankle Dorsiflexion 4-/5   LLE Assessment   LLE Assessment X   Strength LLE   L Hip Flexion 3+/5   L Knee Extension 4/5   L Ankle Dorsiflexion 3+/5   Light Touch   RLE Light Touch Impaired   RLE Light Touch Comments Nueropathy   LLE Light Touch Impaired   LLE Light Touch Comments Neuropathy   Bed Mobility   Additional Comments Pt received OOB in bedside recliner   Transfers   Sit to Stand 3  Moderate assistance   Additional items Assist x 1;Armrests;Increased time required;Verbal cues   Stand to Sit 3  Moderate assistance   Additional items Assist x 1;Verbal cues;Increased time required;Armrests   Ambulation/Elevation   Gait pattern Improper Weight shift;Forward Flexion;Decreased foot clearance;Short stride;Decreased hip extension;Decreased heel strike;Decreased toe off;Decreased R stance   Gait Assistance 3  Moderate assist   Additional items Assist x 1;Verbal cues;Tactile cues   Assistive Device Rolling walker   Distance 25'   Balance   Static Sitting Fair +   Dynamic Sitting Fair   Static Standing Fair -   Dynamic Standing Poor   Ambulatory Poor   Endurance Deficit   Endurance Deficit Yes   Endurance Deficit Description Decreased activity tolerance   Activity Tolerance   Activity Tolerance Patient tolerated treatment well;Patient limited by fatigue   Medical Staff Made Aware PT Madelaine   Nurse Made Aware CITLALLI Tinoco   Assessment   Prognosis Good   Problem List Decreased strength;Decreased endurance;Impaired balance;Decreased mobility;Impaired sensation   Assessment Pt is 85 y.o. female evaluated by PT secondary to admission to [facility] due to pleural effusion. Co-morbidities affecting pt at this time include history of DVT of lower extremity, neuropathy, hypertensive emergency, and lung mass. " Pt lives in a two story home with one step to enter. Pt is able to live on 1st floor with 1/2 bath, and goes up 1 flight of stairs to use the shower. Pt is previously independent in functional mobility and ADLs and pt's son assists in navigating steps to use the shower. Body systems were assessed as indicated, refer to flowsheet for details. Pt performed sit to stand transfer with mod A x 1, requiring tactile cues to facilitate hip extension as well as verbal cues to use armrests and then move hands to RW once standing. Pt ambulated 25' in room with mod A x 1 and RW, requiring tactile cues to maintain balance as well as verbal cues to increase foot clearance with R LE. Pt then performed stand to sit transfer with mod A x 1, requiring assistance in controlling descent into chair as well as verbal cues to utilize armrests. Pt presents with the following impairments: decreased strength, decreased endurance, impaired balance, and decreased mobility. These impairments limit pt's ability to be independent in bed mobility, independently perform transfers, perform dynamic tasks in the household and community, navigate community distances, and navigate elevations, and negotiate uneven surfaces. AM-PAC basic mobility assessment was administered on initial evaluation with pt scoring 11/24. PT to recommend level 2 moderate resource intensity to address above listed impairments and return to PLOF. Pt to benefit from PT.   Barriers to Discharge Inaccessible home environment;Other (Comment);Decreased caregiver support  (Decline in functional mobility)   Goals   [facility] Expiration Date 08/02/25   Short Term Goal #1 In 10 days: Pt will improve bilateral LE strength by 1/2 MMT grade in order to improve ability to perform independent transfers. Pt will be independent in bed mobility in order to decrease caregiver burden. Pt will independently perform transfers in order to decrease caregiver burden. Pt will improve dynamic standing balance  from poor to fair in order to improve ability to perform dynamic tasks in home and the community. Pt will ambulate 150' independently with RW in order to ambulate community distances. Pt will navigate 12 steps with railing and mod A x 1 in order to get to second floor with caregiver assistance.   PT Treatment Day 1   Plan   Treatment/Interventions Functional transfer training;LE strengthening/ROM;Elevations;Therapeutic exercise;Endurance training;Patient/family training;Equipment eval/education;Bed mobility;Gait training;Spoke to nursing;OT;Family   PT Frequency 3-5x/wk   Discharge Recommendation   Rehab Resource Intensity Level, PT II (Moderate Resource Intensity)   AM-PAC Basic Mobility Inpatient   Turning in Flat Bed Without Bedrails 2   Lying on Back to Sitting on Edge of Flat Bed Without Bedrails 2   Moving Bed to Chair 2   Standing Up From Chair Using Arms 2   Walk in Room 2   Climb 3-5 Stairs With Railing 1   Basic Mobility Inpatient Raw Score 11   Basic Mobility Standardized Score 30.25   [facility] Highest Level Of Mobility   -[facility] Goal 4: Move to chair/commode   -[facility] Achieved 7: Walk 25 feet or more   Additional Treatment Session   Start Time 1120   End Time 1132   Treatment Assessment Pt agreeable to PT treatment after initial evaluation. Pt participated in therapeutic exercise focused on LE strengthening. Pt responded well to exercise, requiring minimal verbal and tactile cues to perform exercises with appropriate technique. Additionally, pt required seated rest breaks after each exercise secondary to fatigue. Pt continues to present with the following impairments: decreased strength, decreased endurance, impaired balance, and decreased mobility. These impairments limit pt's ability to be independent in bed mobility, independently perform transfers, perform dynamic tasks in the household and community, navigate community distances, navigate elevations, and negotiate uneven surfaces. PT to continue  to recommend level 2 moderate resource intensity to address above listed impairments and return to PLOF. Pt to continue to benefit from PT.   Exercises   Hip Flexion Sitting;10 reps;AROM;Bilateral   Knee AROM Long Arc Quad Sitting;10 reps;AROM;Bilateral   Ankle Pumps Sitting;20 reps;AROM;Bilateral   End of Consult   Patient Position at End of Consult Bedside chair;Bed/Chair alarm activated;All needs within reach         PT Evaluation Time: 1723-6003  PT Treatment Time: 9720-3293  12 Minutes  JOHNNY Mcrae           [1]   Patient Active Problem List  Diagnosis    History of DVT of lower extremity    Neuropathy    Hypertension    Iron deficiency anemia    Difficulty swallowing    Hypertensive urgency    Pleural effusion    Lung mass   [2]   Past Medical History:  Diagnosis Date    Anemia     Cardiac disease     Cervical spondylolysis     Cervical stenosis of spine     CHF (congestive heart failure) (HCC)     DVT (deep venous thrombosis) (HCC)     Hypertension     Neuropathy     from fall accident    No blood products     Osteoarthritis    [3]   Past Surgical History:  Procedure Laterality Date    BACK SURGERY      CARDIAC PACEMAKER PLACEMENT

## 2025-07-23 NOTE — UTILIZATION REVIEW
Initial Clinical Review    Admission: Date/Time/Statement:   Admission Orders (From admission, onward)       Ordered        07/22/25 1645  INPATIENT ADMISSION  Once                          Orders Placed This Encounter   Procedures    INPATIENT ADMISSION     Standing Status:   Standing     Number of Occurrences:   1     Level of Care:   Med Surg [16]     Estimated length of stay:   More than 2 Midnights     Certification:   I certify that inpatient services are medically necessary for this patient for a duration of greater than two midnights. See H&P and MD Progress Notes for additional information about the patient's course of treatment.     ED Arrival Information       Expected   -    Arrival   7/22/2025 10:20    Acuity   Emergent              Means of arrival   Wheelchair    Escorted by   Family Member    Service   Hospitalist    Admission type   Emergency              Arrival complaint   SOB             Chief Complaint   Patient presents with    Shortness of Breath     Reports she has been feeling SOB for a few days, takes xarelto but ran out and didn't take it for 3 days, hx of DVT        7/22/25 Initial Presentation: 85 y.o. female presents to the ED with complaints of progressive shortness of breath and dyspnea on exertion.Patient reports that her symptoms started about 3 days ago and have been worsening.  She is now unable to lie flat due to her shortness of breath.  She reports mild cough but denies any productive sputum.She remains compliant with her medications. PMH:  DVT, hypertension, iron deficiency anemia, congestive heart failure, neuropathy, osteoarthritis  Exam:  +tachypnea. Reduced breath sounds on right side, + hypertensive /80. Pt given both PO labetalol and IV labetalol. Pressure improved to systolically 150's. Abnormal labs/imaging: CT chest Found to have large right-sided pleural effusion.small pericardial effusion, and known right upper lobe tumor. D Dimer 2.88, . Plan: admit to  inpatient for large right sided Pleural effusion, pulmonary consult for possible thoracentesis, Resume oral hypertensive regimen, Labetalol IV as needed.      Anticipated Length of Stay/Certification Statement:  Patient will be admitted on an inpatient basis with an anticipated length of stay of greater than 2 midnights secondary to pleural effusion.    Date:7/23/25     Day 2: Hospitalist: Patient is doing well she is out of bed in the chair working with physical therapy she does not appear to be in any acute distress she denies any chest pain or shortness of breath she reports some mild dyspnea with physical therapy but otherwise offers no complaints plan no complaints of cough or productive sputum. Remains on room air.  Spo2 95%. Pt/OT eval dc recommendation for level 2 moderate resource intensity. BP improved today. Plan :IR consult for thoracentesis, Pulmonary consulted for possible metastasis.      Pulmonary consult: Right pleural effusion - likely malignant. Abnormal CT chest with known RUL mass - previously declined biopsy/work-up/treatment plan: IR thoracentesis today, Palliative consult.     R THORACENTESIS Procedure Note    Findings: 1350 mL of dark sherman cloudy pleural fluid aspirated from right side, ultrasound-guided thoracentesis.      ED Treatment-Medication Administration from 07/22/2025 1020 to 07/23/2025 0212         Date/Time Order Dose Route Action     07/22/2025 1347 iohexol (OMNIPAQUE) 350 MG/ML injection (MULTI-DOSE) 85 mL 85 mL Intravenous Given     07/22/2025 2034 labetalol (NORMODYNE) tablet 200 mg 200 mg Oral Given     07/22/2025 1936 oxybutynin (DITROPAN) tablet 5 mg 5 mg Oral Given     07/22/2025 2211 gabapentin (NEURONTIN) capsule 100 mg 100 mg Oral Given     07/22/2025 2036 acetaminophen (TYLENOL) tablet 650 mg 650 mg Oral Given     07/22/2025 1936 docusate sodium (COLACE) capsule 100 mg 100 mg Oral Given     07/22/2025 1928 labetalol (NORMODYNE) injection 10 mg 10 mg Intravenous Given             Scheduled Medications:  amLODIPine, 10 mg, Oral, Daily   And  atorvastatin, 40 mg, Oral, Daily  docusate sodium, 100 mg, Oral, BID  furosemide, 40 mg, Oral, Daily  gabapentin, 100 mg, Oral, HS  losartan, 100 mg, Oral, Daily   And  hydroCHLOROthiazide, 12.5 mg, Oral, Daily  labetalol, 200 mg, Oral, Q12H DEO  oxybutynin, 5 mg, Oral, BID  [Held by provider] potassium chloride, 10 mEq, Oral, Daily With Breakfast  rivaroxaban, 20 mg, Oral, Daily With Breakfast      Continuous IV Infusions: none      PRN Meds:  acetaminophen, 650 mg, Oral, Q6H PRN  labetalol, 10 mg, Intravenous, Q12H PRN  trimethobenzamide, 200 mg, Intramuscular, Q6H PRN      ED Triage Vitals   Temperature Pulse Respirations Blood Pressure SpO2 Pain Score   07/22/25 1028 07/22/25 1028 07/22/25 1028 07/22/25 1028 07/22/25 1028 07/22/25 1445   97.6 °F (36.4 °C) 64 16 (!) 189/86 94 % No Pain     Weight (last 2 days)       Date/Time Weight    07/23/25 0233 87.5 (192.9)            Vital Signs (last 3 days)       Date/Time Temp Pulse Resp BP MAP (mmHg) SpO2 O2 Device Patient Position - Orthostatic VS [place] Coma Scale Score Pain    07/23/25 14:24:42 -- 73 16 101/56 71 97 % -- -- -- --    07/23/25 1400 -- -- -- 137/63 90 -- -- -- -- --    07/23/25 13:56:56 -- 75 18 137/63 -- 97 % -- -- -- --    07/23/25 1300 -- -- -- -- -- 91 % -- -- -- --    07/23/25 1215 97.7 °F (36.5 °C) 72 41 149/70 100 96 % -- Sitting -- --    07/23/25 1201 -- -- -- 154/74 -- -- -- Sitting -- --    07/23/25 1153 -- -- -- -- -- -- -- -- -- No Pain    07/23/25 1105 -- -- -- -- -- -- -- -- -- No Pain    07/23/25 1100 -- -- -- -- -- 90 % -- -- -- --    07/23/25 1000 -- 67 -- 113/57 79 96 % -- -- -- --    07/23/25 0900 -- 76 23 139/65 94 96 % -- -- -- --    07/23/25 0800 -- -- -- 187/87 125 95 % -- -- 15 No Pain    07/23/25 0700 98.2 °F (36.8 °C) -- -- 174/85 122 94 % -- -- -- --    07/23/25 0600 -- -- -- 174/82 118 95 % -- -- -- --    07/23/25 0500 -- 86 34 169/84 118 95 % -- -- --  --    07/23/25 0400 -- 84 30 151/90 112 93 % -- -- -- --    07/23/25 0300 -- 86 28 186/87 125 94 % -- -- -- --    07/23/25 0233 98 °F (36.7 °C) 85 20 163/97 -- -- -- -- -- --    07/23/25 0215 -- -- -- -- -- -- -- -- 15 No Pain    07/23/25 0027 -- -- -- -- -- -- -- -- -- No Pain    07/22/25 2200 -- 74 26 153/80 110 95 % -- -- -- --    07/22/25 2130 -- 77 26 152/88 110 97 % -- -- -- --    07/22/25 2100 -- 83 26 -- -- 97 % -- -- -- --    07/22/25 2036 -- -- -- -- -- -- -- -- -- 4    07/22/25 2030 -- 84 24 212/98 140 97 % -- -- -- --    07/22/25 2000 -- 84 22 204/95 137 96 % -- -- -- --    07/22/25 1830 -- 86 17 204/96 138 96 % None (Room air) Lying -- No Pain    07/22/25 1800 -- 87 19 206/99  142 96 % None (Room air) Lying -- No Pain    07/22/25 1700 -- 90 18 195/95 136 96 % None (Room air) Lying -- --    07/22/25 1630 98.2 °F (36.8 °C) 87 19 231/182  203 95 % None (Room air) Lying -- No Pain    07/22/25 1600 -- 85 36 222/100  142 97 % None (Room air) Lying -- --    07/22/25 1445 -- 77 19 210/104  149 97 % None (Room air) Lying -- No Pain    07/22/25 1400 -- 75 20 215/80  136 94 % -- -- -- --    07/22/25 1230 -- 68 34 206/98 140 95 % -- -- -- --    07/22/25 1222 -- 67 20 205/101 145 95 % None (Room air) Lying -- --    07/22/25 1130 -- -- -- -- -- -- None (Room air) -- -- --    07/22/25 1028 97.6 °F (36.4 °C) 64 16 189/86 -- 94 % None (Room air) Sitting -- --              Pertinent Labs/Diagnostic Test Results:   Radiology:  IR IN-Patient Thoracentesis   Final Interpretation by Stan Hooker MD (07/23 7930)   Impression:      Ultrasound-guided right thoracentesis.      Signed, performed, and dictated by MAITE Kaplan under the supervision of Dr. Hooker.      Workstation performed: [id number]         CTA chest pe study   Final Interpretation by Pardeep Donnelly MD (07/22 5110)      1.  No pulmonary embolism.   2.  Large right pleural effusion with significant right middle lobe and complete right lower lobe  passive atelectasis.   3.  Small pericardial effusion.   4.  Known right upper lobe tumor.   5.  Partially imaged possible left hydronephrosis.            Computerized Assisted Algorithm (CAA) may have aided analysis of applicable images.         Resident: JOSLYN CRAFT I, the attending radiologist, have reviewed the images and agree with the final report above.      Workstation performed: [id number]         XR chest 1 view portable   ED Interpretation by Willie Ovalle MD (07/22 1117)   Abnormal   Cardiomegaly with right-sided pleural effusion      Final Interpretation by Mata King MD (07/22 1517)      Large right pleural effusion and right lower lobe atelectasis.            Workstation performed: [id number]           Cardiology:  ECG 12 lead    by Interface, Ris Results In (07/22 1038)          Results from last 7 days   Lab Units 07/23/25  0508 07/22/25  1123   WBC Thousand/uL 9.81 9.50   HEMOGLOBIN g/dL 11.4* 11.1*   HEMATOCRIT % 36.9 35.3   PLATELETS Thousands/uL 234 215   TOTAL NEUT ABS Thousands/µL 7.68* 7.64*         Results from last 7 days   Lab Units 07/23/25  0508 07/22/25  1123   SODIUM mmol/L 142 142   POTASSIUM mmol/L 3.7 5.1   CHLORIDE mmol/L 108 109*   CO2 mmol/L 25 27   ANION GAP mmol/L 9 6   BUN mg/dL 14 14   CREATININE mg/dL 0.75 0.81   EGFR ml/min/1.73sq m 72 65   CALCIUM mg/dL 8.8 8.9   MAGNESIUM mg/dL 2.3  --      Results from last 7 days   Lab Units 07/23/25  0508 07/22/25  1123   AST U/L 13 17   ALT U/L 17 22   ALK PHOS U/L 84 83   TOTAL PROTEIN g/dL 7.5 7.9   ALBUMIN g/dL 3.8 4.1   TOTAL BILIRUBIN mg/dL 0.68 0.67         Results from last 7 days   Lab Units 07/23/25  0508 07/22/25  1123   GLUCOSE RANDOM mg/dL 101 126         Results from last 7 days   Lab Units 07/22/25  1328 07/22/25  1123   HS TNI 0HR ng/L  --  8   HS TNI 2HR ng/L 8  --    HSTNI D2 ng/L 0  --      Results from last 7 days   Lab Units 07/22/25  1145   D-DIMER QUANTITATIVE ug/ml FEU 2.88*     Results  from last 7 days   Lab Units 07/23/25  0937   PROTIME seconds 22.9*   INR  1.94*     Results from last 7 days   Lab Units 07/22/25  2036   TSH 3RD GENERATON uIU/mL 1.835         Results from last 7 days   Lab Units 07/22/25  1145   BNP pg/mL 224*             Results from last 7 days   Lab Units 07/23/25  1426   TOTAL COUNTED  100   WBC FLUID /ul 1,029               Past Medical History[1]  Present on Admission:   Hypertensive urgency   Pleural effusion   Neuropathy      Admitting Diagnosis: Pericardial effusion [I31.39]  HTN (hypertension) [I10]  SOB (shortness of breath) [R06.02]  Pleural effusion [J90]  Lung tumor [D49.1]  Age/Sex: 85 y.o. female    Network Utilization Review Department  ATTENTION: Please call with any questions or concerns to [phone] and carefully listen to the prompts so that you are directed to the right person. All voicemails are confidential.   For Discharge needs, contact Care Management DC Support Team at [phone] opt. [phone]  Send all requests for admission clinical reviews, approved or denied determinations and any other requests to dedicated fax number below belonging to the campus where the patient is receiving treatment. List of dedicated fax numbers for the Facilities:  FACILITY NAME UR FAX NUMBER   ADMISSION DENIALS (Administrative/Medical Necessity) [phone]   DISCHARGE SUPPORT TEAM (NETWORK) [phone]   PARENT CHILD HEALTH (Maternity/NICU/Pediatrics) [phone]   [facility] [phone]   [facility] [phone]   [facility] [phone]   [facility] [phone]   [facility] [phone]   Critical access hospital [phone]   [facility] [phone]   [facility] [phone]   [facility] [phone]   [facility]  HEART [place] [phone]   [facility] [phone]   [facility] [phone]   Count includes the [facility] ORTHOPEDIC [place] [phone]   --           [1]   Past Medical History:  Diagnosis Date    Anemia     Cardiac disease     Cervical spondylolysis     Cervical stenosis of spine     CHF (congestive heart failure) (HCC)     DVT (deep venous thrombosis) ([facility])     Hypertension     Neuropathy     from fall accident    No blood products     Osteoarthritis

## 2025-07-23 NOTE — OCCUPATIONAL THERAPY NOTE
Occupational Therapy Evaluation     Patient Name: Fanta Rinaldis Date: 7/23/2025  Problem List  Principal Problem:    Pleural effusion  Active Problems:    History of DVT of lower extremity    Neuropathy    Hypertensive urgency    Lung mass    Past Medical History  Past Medical History[1]  Past Surgical History  Past Surgical History[2]          07/23/25 1153   Note Type   Note type Evaluation   Pain Assessment   Pain Assessment Tool 0-10   Pain Score No Pain   Restrictions/Precautions   Weight Bearing Precautions Per Order No   Braces or Orthoses Other (Comment)  (none per pt)   Other Precautions Chair Alarm;Bed Alarm;Fall Risk   Home Living   Type of Home House   Home Layout Two level;Able to live on main level with bedroom/bathroom;Stairs to enter with rails;1/2 bath on main level  (1 BERNARDO)   Bathroom Shower/Tub Tub/shower unit   Bathroom Toilet Standard   Bathroom Equipment Shower chair   Bathroom Accessibility Accessible   Home Equipment   (rollator)   Additional Comments Uses walker at baseline   Prior Function   Level of [place] Independent with functional mobility;Needs assistance with IADLS;Needs assistance with ADLs  (Son assists to get upstairs to shower)   Lives With Son;Other (Comment)  (Daughter in law)   Receives Help From Family  (Son, Daughter)   IADLs Family/Friend/Other provides meals;Independent with medication management;Family/Friend/Other provides transportation   Falls in the last 6 months 0   Vocational Retired   ADL   Where Assessed Chair   Eating Assistance 5  Supervision/Setup   Eating Deficit Setup   Grooming Assistance 5  Supervision/Setup   Grooming Deficit Setup   UB Bathing Assistance 4  Minimal Assistance   LB Bathing Assistance 3  Moderate Assistance   UB Dressing Assistance 4  Minimal Assistance   LB Dressing Assistance 3  Moderate Assistance   Toileting Assistance  3  Moderate Assistance   Bed Mobility   Additional Comments Pt OOB start and end of session. BP start  of session 154/74. BP post ambulation 149/70.   Transfers   Sit to Stand 3  Moderate assistance   Additional items Assist x 1;Armrests;Increased time required;Verbal cues   Stand to Sit 3  Moderate assistance   Additional items Assist x 1;Armrests;Increased time required;Verbal cues   Toilet transfer 3  Moderate assistance   Additional items Assist x 1;Increased time required;Armrests;Verbal cues;Commode   Additional Comments w/RW   Functional Mobility   Functional Mobility 4  Minimal assistance   Additional Comments Min A x 1 w/RW from recliner <> commode in front of door to bathroom d/t pt urinating during mobility and having to sit down to finish on commode.   Additional items Rolling walker   Balance   Static Sitting Fair +   Dynamic Sitting Fair   Static Standing Fair -   Dynamic Standing Poor +   Ambulatory Poor +   Activity Tolerance   Activity Tolerance Patient tolerated treatment well;Patient limited by fatigue   Medical Staff Made Aware OT Lorraine   Nurse Made Aware CITLALLI [place]   RUNORBERTO Assessment   RUE Assessment   (3+/5)   LUE Assessment   LUE Assessment   (3+/5)   Hand Function   Gross Motor Coordination Functional   Fine Motor Coordination Functional   Sensation   Light Touch Partial deficits in the RLE;Partial deficits in the LLE  (Neuropathy)   Vision-Basic Assessment   Current Vision No visual deficits   Cognition   Overall Cognitive Status WFL   Arousal/Participation Alert;Responsive;Arousable;Cooperative   Attention Within functional limits   Orientation Level Oriented X4   Memory Within functional limits   Following Commands Follows all commands and directions without difficulty   Assessment   Limitation Decreased ADL status;Decreased UE strength;Decreased Safe judgement during ADL;Decreased endurance;Decreased self-care trans;Decreased high-level ADLs   Prognosis Good   Assessment Pt is a 85 y.o. female seen for OT evaluation s/p admit to [facility] on 7/22/2025 w/ Pleural effusion.  Comorbidities  affecting pt's functional performance at time of assessment include:  Hx DVT, neuropathy, HTN, lung mass, difficulty swallowing, iron deficiency anemia . Personal factors affecting pt at time of IE include:steps to enter environment, difficulty performing ADLS, and difficulty performing IADLS . OT order placed to assess Pt's ADLs, cognitive status, and performance during functional tasks in order to maximize safety and independence while making most appropriate d/c recommendations. Prior to admission, pt was receiving A with adls, iadls, and I with mobility using RW. Upon evaluation: the following deficits impact occupational performance: weakness, decreased strength, decreased balance, decreased tolerance, impaired sensation, and decreased safety awareness. Pt's clinical presentation is currently stable  given new onset deficits that effect Pt's occupational performance and ability to safely return to PLOF including decrease activity tolerance, decrease standing balance, decrease performance during ADL tasks, decrease safety awareness , decrease generalized strength, and decrease performance during functional transfers combined with medical complications of need for input for mobility technique/safety.  Pt to benefit from continued skilled OT tx while in the hospital to address deficits as defined above and maximize level of functional independence w ADL's and functional mobility. Occupational Performance areas to address include: bathing/shower, toilet hygiene, dressing, functional mobility, and clothing management. From OT standpoint, recommendation at time of d/c would be post acute rehab services.      ADLs based on functional performance during eval.   Goals   Patient Goals To go home   Plan   Treatment Interventions ADL retraining;Functional transfer training;UE strengthening/ROM;Endurance training;Patient/family training;Compensatory technique education;Energy conservation;Activityengagement   Goal  Expiration Date 08/02/25   OT Treatment Day 0   OT Frequency 3-5x/wk   Discharge Recommendation   Rehab Resource Intensity Level, OT II (Moderate Resource Intensity)   AM-PAC Daily Activity Inpatient   Lower Body Dressing 2   Bathing 2   Toileting 2   Upper Body Dressing 3   Grooming 4   Eating 4   Daily Activity Raw Score 17   Daily Activity Standardized Score (Calc for Raw Score >=11) 37.26   AM-PAC Applied Cognition Inpatient   Following a Speech/Presentation 4   Understanding Ordinary Conversation 4   Taking Medications 3   Remembering Where Things Are Placed or Put Away 3   Remembering List of 4-5 Errands 3   Taking Care of Complicated Tasks 3   Applied Cognition Raw Score 20   Applied Cognition Standardized Score 41.76     GOALS:    *ADL transfers with (S) for inc'd independence with ADLs/purposeful tasks    *UB ADL with (S) for inc'd independence with self cares    *LB ADL with Min (A) using AE prn for inc'd independence with self cares    *Toileting with (S) for clothing management and hygiene for return to PLOF with personal care    *Increase static stand balance to fair and dyn stand balance to fair- for inc'd safety with standing purposeful tasks    *Increase stand tolerance x5-10 m for inc'd tolerance with standing purposeful tasks    *Participate in 10m UE therex to increase overall stamina/activity tolerance for purposeful tasks      Hung MARTI OTR/L          [1]   Past Medical History:  Diagnosis Date    Anemia     Cardiac disease     Cervical spondylolysis     Cervical stenosis of spine     CHF (congestive heart failure) (HCC)     DVT (deep venous thrombosis) (HCC)     Hypertension     Neuropathy     from fall accident    No blood products     Osteoarthritis    [2]   Past Surgical History:  Procedure Laterality Date    BACK SURGERY      CARDIAC PACEMAKER PLACEMENT

## 2025-07-23 NOTE — ASSESSMENT & PLAN NOTE
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

## 2025-07-23 NOTE — PLAN OF CARE
Problem: SAFETY ADULT  Goal: Patient will remain free of falls  Description: INTERVENTIONS:  - Educate patient/family on patient safety including physical limitations  - Instruct patient to call for assistance with activity   - Consider consulting OT/PT to assist with strengthening/mobility based on AM PAC & JH-HLM score  - Consult OT/PT to assist with strengthening/mobility   - Keep Call bell within reach  - Keep bed low and locked with side rails adjusted as appropriate  - Keep care items and personal belongings within reach  - Initiate and maintain comfort rounds  - Make Fall Risk Sign visible to staff  - Offer Toileting every 3 Hours, in advance of need  - Initiate/Maintain bed alarm    - Apply yellow socks and bracelet for high fall risk patients  - Consider moving patient to room near nurses station  Outcome: Progressing  Goal: Maintain or return to baseline ADL function  Description: INTERVENTIONS:  -  Assess patient's ability to carry out ADLs; assess patient's baseline for ADL function and identify physical deficits which impact ability to perform ADLs (bathing, care of mouth/teeth, toileting, grooming, dressing, etc.)  - Assess/evaluate cause of self-care deficits   - Assess range of motion  - Assess patient's mobility; develop plan if impaired  - Assess patient's need for assistive devices and provide as appropriate  - Encourage maximum independence but intervene and supervise when necessary  - Involve family in performance of ADLs  - Assess for home care needs following discharge   - Consider OT consult to assist with ADL evaluation and planning for discharge  - Provide patient education as appropriate  - Monitor functional capacity and physical performance, use of AM PAC & JH-HLM   - Monitor gait, balance and fatigue with ambulation    Outcome: Progressing  Goal: Maintains/Returns to pre admission functional level  Description: INTERVENTIONS:  - Perform AM-PAC 6 Click Basic Mobility/ Daily Activity  assessment daily.  - Set and communicate daily mobility goal to care team and patient/family/caregiver.   - Collaborate with rehabilitation services on mobility goals if consulted  - Perform Range of Motion 3 times a day.  - Reposition patient every 2 hours.  - Dangle patient 3 times a day  - Stand patient 3 times a day  - Ambulate patient 3 times a day  - Out of bed to chair 3 times a day   - Out of bed for meals 3 times a day  - Out of bed for toileting  - Record patient progress and toleration of activity level   Outcome: Progressing

## 2025-07-23 NOTE — CONSULTS
Consultation - Pulmonology   Name: Fanta Bishop 85 y.o. female I MRN: [id number]  Unit/Bed#: ICU 10 I Date of Admission: 7/22/2025   Date of Service: 7/23/2025 I Hospital Day: 1   Inpatient consult to Pulmonology  Consult performed by: Hammad Coburn PA-C  Consult ordered by: Willie Ovalle MD        Physician Requesting Evaluation: Félix Ortiz MD   Reason for Evaluation / Principal Problem: pleural effusion    Assessment & Plan  Pleural effusion  Patient with complaint of shortness of breath found to have large right-sided pleural effusion with significant right middle lobe and complete right lower lobe atelectasis  Suspect this pleural effusion is malignant in nature given high suspicion for underlying progressive lung malignancy that has not been biopsied or treated  BNP is also elevated although feel less likely that CHF is the culprit of this pleural effusion    Thoracentesis has been ordered, fluid studies ordered  Patient has been following at [facility] hematology and radiation oncology  Lung mass  Patient with bilateral pulmonary nodules initially seen in July 2024, right upper lobe mass was hypermetabolic on CT scan  Patient declined biopsy at that time and opted for surveillance imaging  Follow-up imaging has shown progression with spiculated density in the right midlung now with uptake as well as a hypermetabolic left supraclavicular/left thoracic inlet lymph node  She has not followed up at [facility] since March 2025  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   Pulmonology service will follow.  Please contact the SecureChat role for the Pulmonology service with any questions/concerns.    History of Present Illness   Fanta Bishop is a 85 y.o. female former smoker who quit more than 40 years ago with past medical history of CKD, history of DVT, hypertension, A-fib, CVA who presents with complaint of shortness  of breath over the past few days.    Patient has been following at [facility] for a lung mass.  Patient and family have declined any biopsy or treatment.    Review of Systems   Constitutional: Negative.    HENT: Negative.     Respiratory:  Positive for shortness of breath.    Cardiovascular: Negative.    Gastrointestinal: Negative.    Genitourinary: Negative.    Musculoskeletal: Negative.    Skin: Negative.    Allergic/Immunologic: Negative.    Neurological: Negative.    Psychiatric/Behavioral: Negative.         Historical Information   Medical History Review: I have reviewed the patient's PMH, PSH, Social History, Family History, Meds, and Allergies     Current Facility-Administered Medications:     acetaminophen (TYLENOL) tablet 650 mg, Q6H PRN    amLODIPine (NORVASC) tablet 10 mg, Daily **AND** atorvastatin (LIPITOR) tablet 40 mg, Daily    docusate sodium (COLACE) capsule 100 mg, BID    furosemide (LASIX) tablet 40 mg, Daily    gabapentin (NEURONTIN) capsule 100 mg, HS    losartan (COZAAR) tablet 100 mg, Daily **AND** hydroCHLOROthiazide tablet 12.5 mg, Daily    labetalol (NORMODYNE) injection 10 mg, Q12H PRN    labetalol (NORMODYNE) tablet 200 mg, Q12H DEO    oxybutynin (DITROPAN) tablet 5 mg, BID    [Held by provider] potassium chloride (Klor-Con M10) CR tablet 10 mEq, Daily With Breakfast    rivaroxaban (XARELTO) tablet 20 mg, Daily With Breakfast    trimethobenzamide (TIGAN) IM injection 200 mg, Q6H PRN  Prior to Admission Medications   Prescriptions Last Dose Informant Patient Reported? Taking?   Calcium Carbonate-Vit D-Min (CALCIUM 1200 PO)   Yes No   Sig: Take by mouth   amLODIPine-atorvastatin (CADUET) 10-40 MG per tablet   Yes No   Sig: Take 1 tablet by mouth in the morning.   famotidine (PEPCID) 40 MG tablet   Yes No   Sig: Take 40 mg by mouth   furosemide (LASIX) 40 mg tablet   Yes No   Sig: Take 40 mg by mouth Every other day   gabapentin (NEURONTIN) 100 mg capsule   No No   Sig: Take 1 capsule  (100 mg total) by mouth daily   labetalol (NORMODYNE) 100 mg tablet   No No   Sig: Take 2 tablets (200 mg total) by mouth every 12 (twelve) hours   olmesartan-hydrochlorothiazide (BENICAR HCT) 40-12.5 MG per tablet   Yes No   Sig: Take 1 tablet by mouth if needed   oxybutynin (DITROPAN) 5 mg tablet   Yes No   Sig: Take 5 mg by mouth in the morning and 5 mg in the evening.   potassium chloride (Klor-Con) 10 mEq tablet   Yes No   Sig: Take 10 mEq by mouth   rivaroxaban (XARELTO) 20 mg tablet   No No   Sig: Take 1 tablet (20 mg total) by mouth daily with breakfast      Facility-Administered Medications: None     Tobacco History: remote smoking history  Occupational History:   Family History:Family History[1]    Objective :  Temp:  [97.6 °F (36.4 °C)-98.2 °F (36.8 °C)] 98 °F (36.7 °C)  HR:  [64-90] 86  BP: (151-231)/() 187/87  Resp:  [16-36] 34  SpO2:  [93 %-97 %] 95 %  O2 Device: None (Room air)    Physical Exam  Vitals reviewed.   Constitutional:       General: She is not in acute distress.     Appearance: Normal appearance. She is well-developed. She is not ill-appearing.   HENT:      Head: Normocephalic and atraumatic.      Mouth/Throat:      Pharynx: Oropharynx is clear.     Eyes:      Pupils: Pupils are equal, round, and reactive to light.       Cardiovascular:      Rate and Rhythm: Normal rate and regular rhythm.   Pulmonary:      Effort: Pulmonary effort is normal. No respiratory distress.      Breath sounds: Examination of the right-middle field reveals decreased breath sounds. Examination of the right-lower field reveals decreased breath sounds. Decreased breath sounds present. No wheezing, rhonchi or rales.   Abdominal:      General: Abdomen is flat. There is no distension.     Musculoskeletal:         General: Normal range of motion.      Cervical back: Normal range of motion.      Right lower leg: No edema.      Left lower leg: No edema.     Skin:     General: Skin is warm and dry.      Findings: No  rash.     Neurological:      Mental Status: She is alert and oriented to person, place, and time.     Psychiatric:         Mood and Affect: Mood normal.         Behavior: Behavior normal.           Lab Results: I have reviewed the following results:  .     07/22/25  1123 07/22/25  1145 07/22/25  1328 07/23/25  0508 07/23/25  0937   WBC 9.50  --   --  9.81  --    HGB 11.1*  --   --  11.4*  --    HCT 35.3  --   --  36.9  --      --   --  234  --    SODIUM 142  --   --  142  --    K 5.1  --   --  3.7  --    *  --   --  108  --    CO2 27  --   --  25  --    BUN 14  --   --  14  --    CREATININE 0.81  --   --  0.75  --    GLUC 126  --   --  101  --    MG  --   --   --  2.3  --    AST 17  --   --  13  --    ALT 22  --   --  17  --    ALB 4.1  --   --  3.8  --    TBILI 0.67  --   --  0.68  --    ALKPHOS 83  --   --  84  --    INR  --   --   --   --  1.94*   HSTNI0 8  --   --   --   --    HSTNI2  --   --  8  --   --    BNP  --  224*  --   --   --      ABG: No new results in last 24 hours.    Imaging Results Review: I reviewed radiology reports from this admission including: CT chest.  Other Study Results Review: No additional pertinent studies reviewed.  PFT Results Reviewed: reviewed    VTE Prophylaxis: VTE covered by:  rivaroxaban, Oral, 20 mg at 07/23/25 0832              [1] No family history on file.

## 2025-07-23 NOTE — PROGRESS NOTES
Progress Note - Hospitalist   Name: Fanta Bishop 85 y.o. female I MRN: [id number]  Unit/Bed#: ICU 10 I Date of Admission: 7/22/2025   Date of Service: 7/23/2025 I Hospital Day: 1    Assessment & Plan  Pleural effusion  Patient presents with shortness of breath and dyspnea on exertion  No hypoxia  CTA negative for acute PE  Found to have large right-sided pleural effusion  Unclear etiology  Pulmonary consulted   Concern for possible metastasis  IR consulted for thoracentesis  Continue to monitor respiratory status  Afebrile and hemodynamically stable.  No complaints of cough or productive sputum  History of DVT of lower extremity  Continue with Xarelto  Neuropathy  Continue with gabapentin  Hypertensive urgency  Blood pressure significantly elevated  Resume oral hypertensive regimen  Labetalol IV as needed    VTE Pharmacologic Prophylaxis:   Moderate Risk (Score 3-4) - Pharmacological DVT Prophylaxis Ordered: rivaroxaban (Xarelto).    Mobility:   Basic Mobility Inpatient Raw Score: 14  JH-HLM Goal: 4: Move to chair/commode  JH-HLM Achieved: 2: Bed activities/Dependent transfer  JH-HLM Goal NOT achieved. Continue with multidisciplinary rounding and encourage appropriate mobility to improve upon JH-HLM goals.    Patient Centered Rounds: I performed bedside rounds with nursing staff today.   Discussions with Specialists or Other Care Team Provider: Discussed with pulmonary    Education and Discussions with Family / Patient: Discussed plan of care with patient and patient's daughter Chani    Current Length of Stay: 1 day(s)  Current Patient Status: Inpatient   Certification Statement: The patient will continue to require additional inpatient hospital stay due to thoracentesis  Discharge Plan: Anticipate discharge in 24-48 hrs to discharge location to be determined pending rehab evaluations.    Code Status: Level 1 - Full Code    Subjective   Patient is doing well she is out of bed in the chair working with physical  therapy she does not appear to be in any acute distress she denies any chest pain or shortness of breath she reports some mild dyspnea with physical therapy but otherwise offers no complaints is very pleasant    Objective :  Temp:  [97.6 °F (36.4 °C)-98.2 °F (36.8 °C)] 98 °F (36.7 °C)  HR:  [64-90] 86  BP: (151-231)/() 187/87  Resp:  [16-36] 34  SpO2:  [93 %-97 %] 95 %  O2 Device: None (Room air)    Body mass index is 30.21 kg/m².     Input and Output Summary (last 24 hours):     Intake/Output Summary (Last 24 hours) at 7/23/2025 0921  Last data filed at 7/23/2025 0600  Gross per 24 hour   Intake --   Output 800 ml   Net -800 ml       Physical Exam  Vitals and nursing note reviewed.   Constitutional:       General: She is not in acute distress.     Appearance: She is normal weight. She is ill-appearing.     Cardiovascular:      Rate and Rhythm: Normal rate.   Pulmonary:      Effort: No respiratory distress.   Abdominal:      Palpations: Abdomen is soft.     Musculoskeletal:         General: Normal range of motion.     Skin:     General: Skin is warm.     Neurological:      Mental Status: She is alert. Mental status is at baseline.     Psychiatric:         Mood and Affect: Mood normal.         Thought Content: Thought content normal.       Lines/Drains:  Lines/Drains/Airways       Active Status       Name Placement date Placement time Site Days    External Urinary Catheter 07/23/25  0215  -- less than 1                            Lab Results: I have reviewed the following results:   Results from last 7 days   Lab Units 07/23/25  0508   WBC Thousand/uL 9.81   HEMOGLOBIN g/dL 11.4*   HEMATOCRIT % 36.9   PLATELETS Thousands/uL 234   SEGS PCT % 80*   LYMPHO PCT % 13*   MONO PCT % 6   EOS PCT % 1     Results from last 7 days   Lab Units 07/23/25  0508   SODIUM mmol/L 142   POTASSIUM mmol/L 3.7   CHLORIDE mmol/L 108   CO2 mmol/L 25   BUN mg/dL 14   CREATININE mg/dL 0.75   ANION GAP mmol/L 9   CALCIUM mg/dL 8.8    ALBUMIN g/dL 3.8   TOTAL BILIRUBIN mg/dL 0.68   ALK PHOS U/L 84   ALT U/L 17   AST U/L 13   GLUCOSE RANDOM mg/dL 101                       Recent Cultures (last 7 days):           Last 24 Hours Medication List:     Current Facility-Administered Medications:     acetaminophen (TYLENOL) tablet 650 mg, Q6H PRN    amLODIPine (NORVASC) tablet 10 mg, Daily **AND** atorvastatin (LIPITOR) tablet 40 mg, Daily    docusate sodium (COLACE) capsule 100 mg, BID    furosemide (LASIX) tablet 40 mg, Daily    gabapentin (NEURONTIN) capsule 100 mg, HS    losartan (COZAAR) tablet 100 mg, Daily **AND** hydroCHLOROthiazide tablet 12.5 mg, Daily    labetalol (NORMODYNE) injection 10 mg, Q12H PRN    labetalol (NORMODYNE) tablet 200 mg, Q12H DEO    oxybutynin (DITROPAN) tablet 5 mg, BID    [Held by provider] potassium chloride (Klor-Con M10) CR tablet 10 mEq, Daily With Breakfast    rivaroxaban (XARELTO) tablet 20 mg, Daily With Breakfast    trimethobenzamide (TIGAN) IM injection 200 mg, Q6H PRN    Administrative Statements   Today, Patient Was Seen By: MAITE Lee  I have spent a total time of 45 minutes in caring for this patient on the day of the visit/encounter including Diagnostic results, Risks and benefits of tx options, Patient and family education, Importance of tx compliance, Impressions, Documenting in the medical record, and Reviewing/placing orders in the medical record (including tests, medications, and/or procedures).    **Please Note: This note may have been constructed using a voice recognition system.**

## 2025-07-24 ENCOUNTER — APPOINTMENT (INPATIENT)
Dept: CT IMAGING | Facility: HOSPITAL | Age: 85
End: 2025-07-24
Payer: COMMERCIAL

## 2025-07-24 PROBLEM — R53.83 LETHARGIC: Status: ACTIVE | Noted: 2025-07-24

## 2025-07-24 LAB
ANION GAP SERPL CALCULATED.3IONS-SCNC: 8 MMOL/L (ref 4–13)
BUN SERPL-MCNC: 23 MG/DL (ref 5–25)
CALCIUM SERPL-MCNC: 8.2 MG/DL (ref 8.4–10.2)
CHLORIDE SERPL-SCNC: 107 MMOL/L (ref 96–108)
CO2 SERPL-SCNC: 25 MMOL/L (ref 21–32)
CREAT SERPL-MCNC: 1.12 MG/DL (ref 0.6–1.3)
ERYTHROCYTE [DISTWIDTH] IN BLOOD BY AUTOMATED COUNT: 19.2 % (ref 11.6–15.1)
GFR SERPL CREATININE-BSD FRML MDRD: 44 ML/MIN/1.73SQ M
GLUCOSE SERPL-MCNC: 107 MG/DL (ref 65–140)
HCT VFR BLD AUTO: 33.9 % (ref 34.8–46.1)
HGB BLD-MCNC: 11.2 G/DL (ref 11.5–15.4)
MAGNESIUM SERPL-MCNC: 2.3 MG/DL (ref 1.9–2.7)
MCH RBC QN AUTO: 24.7 PG (ref 26.8–34.3)
MCHC RBC AUTO-ENTMCNC: 33 G/DL (ref 31.4–37.4)
MCV RBC AUTO: 75 FL (ref 82–98)
PLATELET # BLD AUTO: 223 THOUSANDS/UL (ref 149–390)
PMV BLD AUTO: 11.1 FL (ref 8.9–12.7)
POTASSIUM SERPL-SCNC: 3.5 MMOL/L (ref 3.5–5.3)
RBC # BLD AUTO: 4.54 MILLION/UL (ref 3.81–5.12)
SODIUM SERPL-SCNC: 140 MMOL/L (ref 135–147)
WBC # BLD AUTO: 9.68 THOUSAND/UL (ref 4.31–10.16)

## 2025-07-24 PROCEDURE — 97530 THERAPEUTIC ACTIVITIES: CPT

## 2025-07-24 PROCEDURE — 85027 COMPLETE CBC AUTOMATED: CPT | Performed by: NURSE PRACTITIONER

## 2025-07-24 PROCEDURE — 70450 CT HEAD/BRAIN W/O DYE: CPT

## 2025-07-24 PROCEDURE — 83735 ASSAY OF MAGNESIUM: CPT | Performed by: NURSE PRACTITIONER

## 2025-07-24 PROCEDURE — 99232 SBSQ HOSP IP/OBS MODERATE 35: CPT | Performed by: INTERNAL MEDICINE

## 2025-07-24 PROCEDURE — 80048 BASIC METABOLIC PNL TOTAL CA: CPT | Performed by: NURSE PRACTITIONER

## 2025-07-24 PROCEDURE — 99232 SBSQ HOSP IP/OBS MODERATE 35: CPT | Performed by: NURSE PRACTITIONER

## 2025-07-24 RX ORDER — ALBUMIN (HUMAN) 12.5 G/50ML
12.5 SOLUTION INTRAVENOUS ONCE
Status: COMPLETED | OUTPATIENT
Start: 2025-07-24 | End: 2025-07-24

## 2025-07-24 RX ADMIN — FUROSEMIDE 40 MG: 40 TABLET ORAL at 09:09

## 2025-07-24 RX ADMIN — GABAPENTIN 100 MG: 100 CAPSULE ORAL at 23:26

## 2025-07-24 RX ADMIN — AMLODIPINE BESYLATE 10 MG: 10 TABLET ORAL at 09:09

## 2025-07-24 RX ADMIN — ATORVASTATIN CALCIUM 40 MG: 40 TABLET, FILM COATED ORAL at 09:09

## 2025-07-24 RX ADMIN — LABETALOL HYDROCHLORIDE 200 MG: 200 TABLET, FILM COATED ORAL at 09:08

## 2025-07-24 RX ADMIN — LABETALOL HYDROCHLORIDE 200 MG: 200 TABLET, FILM COATED ORAL at 20:46

## 2025-07-24 RX ADMIN — OXYBUTYNIN CHLORIDE 5 MG: 5 TABLET ORAL at 09:09

## 2025-07-24 RX ADMIN — ACETAMINOPHEN 650 MG: 325 TABLET ORAL at 20:46

## 2025-07-24 RX ADMIN — HYDROCHLOROTHIAZIDE 12.5 MG: 12.5 TABLET ORAL at 09:08

## 2025-07-24 RX ADMIN — DOCUSATE SODIUM 100 MG: 100 CAPSULE, LIQUID FILLED ORAL at 19:25

## 2025-07-24 RX ADMIN — RIVAROXABAN 20 MG: 20 TABLET, FILM COATED ORAL at 09:08

## 2025-07-24 RX ADMIN — ALBUMIN (HUMAN) 12.5 G: 0.25 INJECTION, SOLUTION INTRAVENOUS at 11:07

## 2025-07-24 RX ADMIN — OXYBUTYNIN CHLORIDE 5 MG: 5 TABLET ORAL at 19:25

## 2025-07-24 RX ADMIN — LOSARTAN POTASSIUM 100 MG: 50 TABLET, FILM COATED ORAL at 09:08

## 2025-07-24 NOTE — UTILIZATION REVIEW
Initial Clinical Review - Continued    SEE INITIAL REVIEW AT BOTTOM    Date: 7/24/25                      Day 7/24/24    Current Patient Class: Inpatient  Current Level of Care: MS    HPI:85 y.o. female initially admitted on 7/22/2025 for  Pleural effusion.  Presented with shortness of breath and dyspnea on exertion.  Found to have large pleural effusion. IR did thoracentesis - 1350 dark cloudy sherman fluid aspirated.      Assessment/Plan: Patient has crossed 3 midnights and requires ongoing care    7/24/2025 .  Patient presents with  being tired today.  Part way through conversation,  closes eyes.    On exam: appears ill.  Lethargic.    Abnormal labs or imaging:  H&H 11.2/33.9.    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     Continue to  monitor respiratory status.  On Xarelto.  Antihypertensives with labetalol as needed.  Given albumin    Medications:   Scheduled Medications:  amLODIPine, 10 mg, Oral, Daily   And  atorvastatin, 40 mg, Oral, Daily  docusate sodium, 100 mg, Oral, BID  furosemide, 40 mg, Oral, Daily  gabapentin, 100 mg, Oral, HS  losartan, 100 mg, Oral, Daily   And  hydroCHLOROthiazide, 12.5 mg, Oral, Daily  labetalol, 200 mg, Oral, Q12H DEO  oxybutynin, 5 mg, Oral, BID  potassium chloride, 10 mEq, Oral, Daily With Breakfast  rivaroxaban, 20 mg, Oral, Daily With Breakfast    albumin human (FLEXBUMIN) 25 % injection 12.5 g  Dose: 12.5 g  Freq: Once Route: IV  Start: 07/24/25 1115 End: 07/24/25 1107    Continuous IV Infusions:     PRN Meds: not used.   acetaminophen, 650 mg, Oral, Q6H PRN  labetalol, 10 mg, Intravenous, Q12H PRN  trimethobenzamide, 200 mg, Intramuscular, Q6H PRN      Vital Signs:   Vital Signs (last 3 days)       Date/Time Temp Pulse Resp BP MAP (mmHg) SpO2 O2 Device Patient Position - Orthostatic VS Sherri Coma Scale Score Pain    07/24/25 1215 -- 73 -- 111/57 74 96 % -- -- -- --    07/24/25 1200 -- 60  -- 99/54 74 96 % -- -- -- --    07/24/25 1145 -- 60 -- 97/56 74 96 % -- -- -- --    07/24/25 1130 -- 63 -- 95/52 69 90 % -- -- -- --    07/24/25 1100 -- -- -- 82/48 61 -- -- -- -- --    07/24/25 1035 -- -- -- -- -- -- -- -- -- No Pain    07/24/25 0800 -- -- -- -- -- -- -- -- 15 No Pain    07/24/25 0700 98.2 °F (36.8 °C) 60 -- 127/65 89 93 % -- Lying -- --    07/23/25 2334 98.7 °F (37.1 °C) 81 26 150/75 106 93 % -- -- -- --    07/23/25 2214 -- 82 -- 154/74 -- -- -- -- -- --    07/23/25 2025 -- 79 18 154/74 106 89 % None (Room air) Sitting -- --    07/23/25 2000 -- -- -- -- -- -- -- -- 15 No Pain    07/23/25 1900 -- 87 25 153/72 104 88 % -- -- -- --    07/23/25 14:24:42 -- 73 16 101/56 71 97 % -- -- -- --    07/23/25 1400 -- -- -- 137/63 90 -- -- -- -- --    07/23/25 13:56:56 -- 75 18 137/63 -- 97 % -- -- -- --    07/23/25 1300 -- -- -- -- -- 91 % -- -- -- --    07/23/25 1215 97.7 °F (36.5 °C) 72 41 149/70 100 96 % -- Sitting -- --    07/23/25 1201 -- -- -- 154/74 -- -- -- Sitting -- --    07/23/25 1153 -- -- -- -- -- -- -- -- -- No Pain    07/23/25 1105 -- -- -- -- -- -- -- -- -- No Pain    07/23/25 1100 -- -- -- -- -- 90 % -- -- -- --    07/23/25 1000 -- 67 -- 113/57 79 96 % -- -- -- --    07/23/25 0900 -- 76 23 139/65 94 96 % -- -- -- --    07/23/25 0800 -- -- -- 187/87 125 95 % -- -- 15 No Pain    07/23/25 0700 98.2 °F (36.8 °C) -- -- 174/85 122 94 % -- -- -- --    07/23/25 0600 -- -- -- 174/82 118 95 % -- -- -- --    07/23/25 0500 -- 86 34 169/84 118 95 % -- -- -- --    07/23/25 0400 -- 84 30 151/90 112 93 % -- -- -- --    07/23/25 0300 -- 86 28 186/87 125 94 % -- -- -- --    07/23/25 0233 98 °F (36.7 °C) 85 20 163/97 -- -- -- -- -- --    07/23/25 0215 -- -- -- -- -- -- -- -- 15 No Pain    07/23/25 0027 -- -- -- -- -- -- -- -- -- No Pain    07/22/25 2200 -- 74 26 153/80 110 95 % -- -- -- --    07/22/25 2130 -- 77 26 152/88 110 97 % -- -- -- --    07/22/25 2100 -- 83 26 -- -- 97 % -- -- -- --    07/22/25 2036 --  -- -- -- -- -- -- -- -- 4    07/22/25 2030 -- 84 24 212/98 140 97 % -- -- -- --    07/22/25 2000 -- 84 22 204/95 137 96 % -- -- -- --    07/22/25 1830 -- 86 17 204/96 138 96 % None (Room air) Lying -- No Pain    07/22/25 1800 -- 87 19 206/99  142 96 % None (Room air) Lying -- No Pain    07/22/25 1700 -- 90 18 195/95 136 96 % None (Room air) Lying -- --    07/22/25 1630 98.2 °F (36.8 °C) 87 19 231/182  203 95 % None (Room air) Lying -- No Pain    07/22/25 1600 -- 85 36 222/100  142 97 % None (Room air) Lying -- --    07/22/25 1445 -- 77 19 210/104  149 97 % None (Room air) Lying -- No Pain    07/22/25 1400 -- 75 20 215/80  136 94 % -- -- -- --    07/22/25 1230 -- 68 34 206/98 140 95 % -- -- -- --    07/22/25 1222 -- 67 20 205/101 145 95 % None (Room air) Lying -- --    07/22/25 1130 -- -- -- -- -- -- None (Room air) -- -- --    07/22/25 1028 97.6 °F (36.4 °C) 64 16 189/86 -- 94 % None (Room air) Sitting -- --          Pertinent Labs/Diagnostic Results:   Radiology:  CT head wo contrast   Final Interpretation by Estuardo Read MD (07/24 1304)      No acute intracranial abnormality. 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                  Workstation performed: [id number]         IR IN-Patient Thoracentesis   Final Interpretation by Stan Hooker MD (07/23 1606)   Impression:      Ultrasound-guided right thoracentesis.      Signed, performed, and dictated by MAITE Kaplan under the supervision of Dr. Hooker.      Workstation performed: [id number]         CTA chest pe study   Final Interpretation by Pardeep Donnelly MD (07/22 5391)      1.  No pulmonary embolism.   2.  Large right pleural effusion with significant right middle lobe and complete right lower lobe passive atelectasis.   3.  Small pericardial effusion.   4.  Known right upper lobe tumor.   5.  Partially imaged possible left hydronephrosis.            Computerized Assisted Algorithm (CAA) may have aided  analysis of applicable images.         Resident: JOSLYN CRAFT I, the attending radiologist, have reviewed the images and agree with the final report above.      Workstation performed: [id number]         XR chest 1 view portable   ED Interpretation by Willie Ovalle MD (07/22 1117)   Abnormal   Cardiomegaly with right-sided pleural effusion      Final Interpretation by Mata King MD (07/22 1517)      Large right pleural effusion and right lower lobe atelectasis.            Workstation performed: [id number]           Cardiology:  ECG 12 lead    by Interface, Ris Results In (07/22 1038)        GI:  No orders to display     Results from last 7 days   Lab Units 07/24/25  0518 07/23/25  0508 07/22/25  1123   WBC Thousand/uL 9.68 9.81 9.50   HEMOGLOBIN g/dL 11.2* 11.4* 11.1*   HEMATOCRIT % 33.9* 36.9 35.3   PLATELETS Thousands/uL 223 234 215   TOTAL NEUT ABS Thousands/µL  --  7.68* 7.64*     Results from last 7 days   Lab Units 07/24/25  0518 07/23/25  0508 07/22/25  1123   SODIUM mmol/L 140 142 142   POTASSIUM mmol/L 3.5 3.7 5.1   CHLORIDE mmol/L 107 108 109*   CO2 mmol/L 25 25 27   ANION GAP mmol/L 8 9 6   BUN mg/dL 23 14 14   CREATININE mg/dL 1.12 0.75 0.81   EGFR ml/min/1.73sq m 44 72 65   CALCIUM mg/dL 8.2* 8.8 8.9   MAGNESIUM mg/dL 2.3 2.3  --      Results from last 7 days   Lab Units 07/23/25  0508 07/22/25  1123   AST U/L 13 17   ALT U/L 17 22   ALK PHOS U/L 84 83   TOTAL PROTEIN g/dL 7.5 7.9   ALBUMIN g/dL 3.8 4.1   TOTAL BILIRUBIN mg/dL 0.68 0.67     Results from last 7 days   Lab Units 07/24/25  0518 07/23/25  0508 07/22/25  1123   GLUCOSE RANDOM mg/dL 107 101 126     Results from last 7 days   Lab Units 07/22/25  1328 07/22/25  1123   HS TNI 0HR ng/L  --  8   HS TNI 2HR ng/L 8  --    HSTNI D2 ng/L 0  --      Results from last 7 days   Lab Units 07/22/25  1145   D-DIMER QUANTITATIVE ug/ml FEU 2.88*     Results from last 7 days   Lab Units 07/23/25  0937   PROTIME seconds 22.9*   INR  1.94*      Results from last 7 days   Lab Units 07/22/25  2036   TSH 3RD GENERATON uIU/mL 1.835     Results from last 7 days   Lab Units 07/22/25  1145   BNP pg/mL 224*     Results from last 7 days   Lab Units 07/23/25  1425   GRAM STAIN RESULT  No Polys or Bacteria seen   BODY FLUID CULTURE, STERILE  No growth     Results from last 7 days   Lab Units 07/23/25  1426   TOTAL COUNTED  100   WBC FLUID /ul 1,029         Network Utilization Review Department  ATTENTION: Please call with any questions or concerns to [phone] and carefully listen to the prompts so that you are directed to the right person. All voicemails are confidential.   For Discharge needs, contact Care Management DC Support Team at [phone] opt. 2  Send all requests for admission clinical reviews, approved or denied determinations and any other requests to dedicated fax number below belonging to the campus where the patient is receiving treatment. List of dedicated fax numbers for the Facilities:  FACILITY NAME UR FAX NUMBER   ADMISSION DENIALS (Administrative/Medical Necessity) [phone]   DISCHARGE SUPPORT TEAM (NETWORK) [phone]   PARENT CHILD HEALTH (Maternity/NICU/Pediatrics) [phone]   [facility] [phone]   [facility] [phone]   [facility] [phone]   [facility] [phone]   [facility] [phone]   [facility] [phone]   [facility] [phone]   [facility] [phone]   [facility] [phone]   [facility] [phone]   [facility] [phone]   [facility] [phone]   [facility] [phone]   --

## 2025-07-24 NOTE — ASSESSMENT & PLAN NOTE
Patient presents with shortness of breath and dyspnea on exertion  No hypoxia  CTA negative for acute PE  Found to have large right-sided pleural effusion  Unclear etiology  Pulmonary consulted   Concern for possible metastasis  IR consulted for thoracentesis  Completed 7/24 removed 1350 of dark cloudy sherman fluid was aspirated. Specimens obtained/sent as ordered   Continue to monitor respiratory status  Afebrile and hemodynamically stable.  No complaints of cough or productive sputum

## 2025-07-24 NOTE — PLAN OF CARE
Problem: PHYSICAL THERAPY ADULT  Goal: Performs mobility at highest level of function for planned discharge setting.  See evaluation for individualized goals.  Description: Treatment/Interventions: Functional transfer training, LE strengthening/ROM, Elevations, Therapeutic exercise, Endurance training, Patient/family training, Equipment eval/education, Bed mobility, Gait training, Spoke to nursing, OT, Family          See flowsheet documentation for full assessment, interventions and recommendations.  Note: Prognosis: Good  Problem List: Decreased strength, Decreased endurance, Impaired balance, Decreased mobility  Assessment: Chart reviewed. Patient was received seated in recliner in [facility] and agreeable to PT session. Today's PT treatment session consisted of therapeutic activity for facilitation of transitional movements and safe performance of correct technique for sit to stand transfers, therapeutic exercise to increase lower extremity muscle strength, and gait training to promote safe and functional ambulation on level surfaces. In comparison to the previous session the patient has not made progress as evident by requiring increased assistance with sit to stand transfers and ambulation. Pt required assist of for all functional mobility. Pt's ambulation was also limited this session. When ambulating pt exhibits decreased right stance time, decreased sd, step to pattern, and increased knee flexion during stance phase. Pt presenting with decreased alertness compared to previous session. Pt responsive and able to answer questions, but appearing altered. Post ambulation pt's BP 82/48. RN Misti present and NP Breanne present and aware. Pt was reclined in recliner chair and instructed to perform ankle pumps. Overall, patient had fair tolerance to today's treatment session. Pt with no functional progress towards achieving her STG's. Patient's prognosis for achieving her STG's is good. PT intervention continues  to be appropriate as the patient continues to be limited by decreased lower extremity strength, impaired balance, decreased endurance, gait deviations, and decreased functional mobility. Continue to recommend level 2, moderate resource intensity. PT to continue to see patient in order to address the deficits listed above and provide interventions consistent with the POC in order to achieve STG's and optimize the patient's independence with functional mobility.    Barriers to Discharge: Inaccessible home environment, Decreased caregiver support, Other (Comment) (decline in functional mobility)     Rehab Resource Intensity Level, PT: II (Moderate Resource Intensity)    See flowsheet documentation for full assessment.

## 2025-07-24 NOTE — ASSESSMENT & PLAN NOTE
Patient with complaint of shortness of breath found to have large right-sided pleural effusion with significant right middle lobe and complete right lower lobe atelectasis  Suspect this pleural effusion is malignant in nature given high suspicion for underlying progressive lung malignancy that has not been biopsied or treated  BNP is also elevated although feel less likely that CHF is the culprit of this pleural effusion    Thora complete 7/23 1350 mL dark sherman cloudy pleural fluid aspirated from right side  Fluid appears exudative in nature, 32% neutrophils, 21% lymphocytes, 40% histiocytes  Cytology pending

## 2025-07-24 NOTE — PROGRESS NOTES
Progress Note - Pulmonology   Name: Fanta Bishop 85 y.o. female I MRN: [id number]  Unit/Bed#: ICU 10 I Date of Admission: 7/22/2025   Date of Service: 7/24/2025 I Hospital Day: 2    Assessment & Plan  Pleural effusion  Patient with complaint of shortness of breath found to have large right-sided pleural effusion with significant right middle lobe and complete right lower lobe atelectasis  Suspect this pleural effusion is malignant in nature given high suspicion for underlying progressive lung malignancy that has not been biopsied or treated  BNP is also elevated although feel less likely that CHF is the culprit of this pleural effusion    Thora complete 7/23 1350 mL dark sherman cloudy pleural fluid aspirated from right side  Fluid appears exudative in nature, 32% neutrophils, 21% lymphocytes, 40% histiocytes  Cytology pending  Lung mass  Patient with bilateral pulmonary nodules initially seen in July 2024, right upper lobe mass was hypermetabolic on CT scan  Patient declined biopsy at that time and opted for surveillance imaging  Follow-up imaging has shown progression with spiculated density in the right midlung now with uptake as well as a hypermetabolic left supraclavicular/left thoracic inlet lymph node  She has not followed up at [facility] since March 2025  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  Found resting comfortably in a recliner chair.  Does not appear in respiratory distress.  Saturations stable on room air    Objective :  Temp:  [97.7 °F (36.5 °C)-98.7 °F (37.1 °C)] 98.2 °F (36.8 °C)  HR:  [60-87] 60  BP: (101-154)/(56-75) 127/65  Resp:  [16-41] 26  SpO2:  [88 %-97 %] 93 %  O2 Device: None (Room air)    Physical Exam  Vitals reviewed.   Constitutional:       General: She is not in acute distress.     Appearance: She is ill-appearing.   HENT:      Head: Normocephalic and atraumatic.      Right Ear:  External ear normal.      Left Ear: External ear normal.      Nose: Nose normal.      Mouth/Throat:      Mouth: Mucous membranes are moist.      Pharynx: Oropharynx is clear.     Eyes:      Extraocular Movements: Extraocular movements intact.      Pupils: Pupils are equal, round, and reactive to light.       Cardiovascular:      Rate and Rhythm: Normal rate and regular rhythm.      Pulses: Normal pulses.      Heart sounds: Normal heart sounds.   Pulmonary:      Effort: Pulmonary effort is normal.      Breath sounds: No wheezing or rhonchi.      Comments: Diminished breath sounds bilaterally  Abdominal:      Palpations: Abdomen is soft.     Skin:     General: Skin is warm.     Neurological:      Mental Status: She is alert and oriented to person, place, and time.     Psychiatric:         Mood and Affect: Mood normal.         Behavior: Behavior normal.         Thought Content: Thought content normal.         Judgment: Judgment normal.         Lab Results: I have reviewed the following results:   .     07/24/25  0518   WBC 9.68   HGB 11.2*   HCT 33.9*      SODIUM 140   K 3.5      CO2 25   BUN 23   CREATININE 1.12   GLUC 107   MG 2.3     ABG: No new results in last 24 hours.    Imaging Results Review: I reviewed radiology reports from this admission including: CT chest.  CTA chest PE study 07/22/2025  1.  No pulmonary embolism.  2.  Large right pleural effusion with significant right middle lobe and complete right lower lobe passive atelectasis.  3.  Small pericardial effusion.  4.  Known right upper lobe tumor.  5.  Partially imaged possible left hydronephrosis.  Other Study Results Review: Other studies reviewed include: ECHO  Echo 08/2023 shows LVEF 60 to 65% systolic function normal, wall motion normal indeterminate diastolic function PASP 50.0 mmHg  PFT Results Reviewed: reviewed

## 2025-07-24 NOTE — PLAN OF CARE
Problem: PAIN - ADULT  Goal: Verbalizes/displays adequate comfort level or baseline comfort level  Description: Interventions:  - Encourage patient to monitor pain and request assistance  - Assess pain using appropriate pain scale  - Administer analgesics as ordered based on type and severity of pain and evaluate response  - Implement non-pharmacological measures as appropriate and evaluate response  - Consider cultural and social influences on pain and pain management  - Notify physician/advanced practitioner if interventions unsuccessful or patient reports new pain  - Educate patient/family on pain management process including their role and importance of  reporting pain   - Provide non-pharmacologic/complimentary pain relief interventions  Outcome: Progressing     Problem: INFECTION - ADULT  Goal: Absence or prevention of progression during hospitalization  Description: INTERVENTIONS:  - Assess and monitor for signs and symptoms of infection  - Monitor lab/diagnostic results  - Monitor all insertion sites, i.e. indwelling lines, tubes, and drains  - Monitor endotracheal if appropriate and nasal secretions for changes in amount and color  - [place] appropriate cooling/warming therapies per order  - Administer medications as ordered  - Instruct and encourage patient and family to use good hand hygiene technique  - Identify and instruct in appropriate isolation precautions for identified infection/condition  Outcome: Progressing     Problem: RESPIRATORY - ADULT  Goal: Achieves optimal ventilation and oxygenation  Description: INTERVENTIONS:  - Assess for changes in respiratory status  - Assess for changes in mentation and behavior  - Position to facilitate oxygenation and minimize respiratory effort  - Oxygen administered by appropriate delivery if ordered  - Initiate smoking cessation education as indicated  - Encourage broncho-pulmonary hygiene including cough, deep breathe, Incentive Spirometry  - Assess the  need for suctioning and aspirate as needed  - Assess and instruct to report SOB or any respiratory difficulty  - Respiratory Therapy support as indicated  Outcome: Progressing     Problem: CARDIOVASCULAR - ADULT  Goal: Maintains optimal cardiac output and hemodynamic stability  Description: INTERVENTIONS:  - Monitor I/O, vital signs and rhythm  - Monitor for S/S and trends of decreased cardiac output  - Administer and titrate ordered vasoactive medications to optimize hemodynamic stability  - Assess quality of pulses, skin color and temperature  - Assess for signs of decreased coronary artery perfusion  - Instruct patient to report change in severity of symptoms  Outcome: Progressing  Goal: Absence of cardiac dysrhythmias or at baseline rhythm  Description: INTERVENTIONS:  - Continuous cardiac monitoring, vital signs, obtain 12 lead EKG if ordered  - Administer antiarrhythmic and heart rate control medications as ordered  - Monitor electrolytes and administer replacement therapy as ordered  Outcome: Progressing

## 2025-07-24 NOTE — PROGRESS NOTES
Progress Note - Hospitalist   Name: Fanta Bishop 85 y.o. female I MRN: [id number]  Unit/Bed#: ICU 10 I Date of Admission: 7/22/2025   Date of Service: 7/24/2025 I Hospital Day: 2    Assessment & Plan  Pleural effusion  Patient presents with shortness of breath and dyspnea on exertion  No hypoxia  CTA negative for acute PE  Found to have large right-sided pleural effusion  Unclear etiology  Pulmonary consulted   Concern for possible metastasis  IR consulted for thoracentesis  Completed 7/24 removed 1350 of dark cloudy sherman fluid was aspirated. Specimens obtained/sent as ordered   Continue to monitor respiratory status  Afebrile and hemodynamically stable.  No complaints of cough or productive sputum  History of DVT of lower extremity  Continue with Xarelto  Neuropathy  Continue with gabapentin  Hypertensive urgency  Blood pressure significantly elevated  Resume oral hypertensive regimen  Labetalol IV as needed  Lung mass      VTE Pharmacologic Prophylaxis:   Moderate Risk (Score 3-4) - Pharmacological DVT Prophylaxis Ordered: rivaroxaban (Xarelto).    Mobility:   Basic Mobility Inpatient Raw Score: 14  JH-HLM Goal: 4: Move to chair/commode  JH-HLM Achieved: 2: Bed activities/Dependent transfer  JH-HLM Goal NOT achieved. Continue with multidisciplinary rounding and encourage appropriate mobility to improve upon JH-HLM goals.    Patient Centered Rounds: I performed bedside rounds with nursing staff today.   Discussions with Specialists or Other Care Team Provider: Reviewed pulmonary notes    Education and Discussions with Family / Patient: Discussed plan of care with patient and patient's daughter Chani    Current Length of Stay: 2 day(s)  Current Patient Status: Inpatient   Certification Statement: The patient will continue to require additional inpatient hospital stay due to monitoring of patient's mentation  Discharge Plan: Anticipate discharge in 24-48 hrs to home.    Code Status: Level 1 - Full  Code    Subjective   Patient is pleasant but seems rather tired today she can follow all commands she got out of bed into the chair this morning with nursing but partway through conversation she tends to close her eyes and ramble    Objective :  Temp:  [97.7 °F (36.5 °C)-98.7 °F (37.1 °C)] 98.2 °F (36.8 °C)  HR:  [60-87] 60  BP: (101-154)/(56-75) 127/65  Resp:  [16-41] 26  SpO2:  [88 %-97 %] 93 %  O2 Device: None (Room air)    Body mass index is 30.21 kg/m².     Input and Output Summary (last 24 hours):     Intake/Output Summary (Last 24 hours) at 7/24/2025 1054  Last data filed at 7/23/2025 1101  Gross per 24 hour   Intake --   Output 400 ml   Net -400 ml       Physical Exam  Vitals and nursing note reviewed.   Constitutional:       General: She is not in acute distress.     Appearance: She is ill-appearing.     Cardiovascular:      Rate and Rhythm: Normal rate.   Pulmonary:      Effort: No respiratory distress.     Musculoskeletal:         General: No swelling.     Skin:     General: Skin is warm.     Neurological:      Mental Status: She is easily aroused. Mental status is at baseline. She is lethargic.      Cranial Nerves: No cranial nerve deficit.     Psychiatric:         Speech: Speech normal.         Behavior: Behavior is cooperative.         Lines/Drains:              Lab Results: I have reviewed the following results:   Results from last 7 days   Lab Units 07/24/25  0518 07/23/25  0508   WBC Thousand/uL 9.68 9.81   HEMOGLOBIN g/dL 11.2* 11.4*   HEMATOCRIT % 33.9* 36.9   PLATELETS Thousands/uL 223 234   SEGS PCT %  --  80*   LYMPHO PCT %  --  13*   MONO PCT %  --  6   EOS PCT %  --  1     Results from last 7 days   Lab Units 07/24/25  0518 07/23/25  0508   SODIUM mmol/L 140 142   POTASSIUM mmol/L 3.5 3.7   CHLORIDE mmol/L 107 108   CO2 mmol/L 25 25   BUN mg/dL 23 14   CREATININE mg/dL 1.12 0.75   ANION GAP mmol/L 8 9   CALCIUM mg/dL 8.2* 8.8   ALBUMIN g/dL  --  3.8   TOTAL BILIRUBIN mg/dL  --  0.68   ALK  PHOS U/L  --  84   ALT U/L  --  17   AST U/L  --  13   GLUCOSE RANDOM mg/dL 107 101     Results from last 7 days   Lab Units 07/23/25  0937   INR  1.94*                   Recent Cultures (last 7 days):   Results from last 7 days   Lab Units 07/23/25  1425   GRAM STAIN RESULT  No Polys or Bacteria seen         Last 24 Hours Medication List:     Current Facility-Administered Medications:     acetaminophen (TYLENOL) tablet 650 mg, Q6H PRN    amLODIPine (NORVASC) tablet 10 mg, Daily **AND** atorvastatin (LIPITOR) tablet 40 mg, Daily    docusate sodium (COLACE) capsule 100 mg, BID    furosemide (LASIX) tablet 40 mg, Daily    gabapentin (NEURONTIN) capsule 100 mg, HS    losartan (COZAAR) tablet 100 mg, Daily **AND** hydroCHLOROthiazide tablet 12.5 mg, Daily    labetalol (NORMODYNE) injection 10 mg, Q12H PRN    labetalol (NORMODYNE) tablet 200 mg, Q12H DEO    oxybutynin (DITROPAN) tablet 5 mg, BID    potassium chloride (Klor-Con M10) CR tablet 10 mEq, Daily With Breakfast    rivaroxaban (XARELTO) tablet 20 mg, Daily With Breakfast    trimethobenzamide (TIGAN) IM injection 200 mg, Q6H PRN    Administrative Statements   Today, Patient Was Seen By: MAITE Lee  I have spent a total time of 45 minutes in caring for this patient on the day of the visit/encounter including Diagnostic results, Risks and benefits of tx options, Importance of tx compliance, Impressions, Counseling / Coordination of care, Reviewing/placing orders in the medical record (including tests, medications, and/or procedures), and Communicating with other healthcare professionals .    **Please Note: This note may have been constructed using a voice recognition system.**

## 2025-07-24 NOTE — ASSESSMENT & PLAN NOTE
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

## 2025-07-24 NOTE — ASSESSMENT & PLAN NOTE
Patient with bilateral pulmonary nodules initially seen in July 2024, right upper lobe mass was hypermetabolic on CT scan  Patient declined biopsy at that time and opted for surveillance imaging  Follow-up imaging has shown progression with spiculated density in the right midlung now with uptake as well as a hypermetabolic left supraclavicular/left thoracic inlet lymph node  She has not followed up at [facility] since March 2025  Suspect this would be a stage IV malignancy

## 2025-07-24 NOTE — PHYSICAL THERAPY NOTE
"   Physical Therapy Treatment Note    Patient Name: Fanta Bishop    Diagnosis: Pericardial effusion [I31.39]  HTN (hypertension) [I10]  SOB (shortness of breath) [R06.02]  Pleural effusion [J90]  Lung tumor [D49.1]     07/24/25 1035   PT Last Visit   PT Visit Date 07/24/25   Note Type   Note Type Treatment   Pain Assessment   Pain Assessment Tool 0-10   Pain Score No Pain   Restrictions/Precautions   Weight Bearing Precautions Per Order No   Other Precautions Chair Alarm;Bed Alarm;Multiple lines;Fall Risk   General   Chart Reviewed Yes   Response to Previous Treatment Patient with no complaints from previous session.   Family/Caregiver Present No   Cognition   Arousal/Participation Arousable;Responsive   Attention Attends with cues to redirect   Orientation Level Oriented to person;Oriented to place;Oriented to situation  (pt stated Feb 2025 for time)   Memory Decreased recall of recent events   Following Commands Follows one step commands with increased time or repetition   Comments Pt agreeable to PT.   Subjective   Subjective \"I'm sleepy.\"   Bed Mobility   Additional Comments Pt was received seated in recliner in [facility].   Transfers   Sit to Stand 3  Moderate assistance   Additional items Assist x 2;Armrests;Increased time required;Verbal cues   Stand to Sit 3  Moderate assistance   Additional items Assist x 2;Armrests;Increased time required;Verbal cues   Ambulation/Elevation   Gait pattern Decreased toe off;Decreased heel strike;Decreased hip extension;Knees flexed;Excessively slow;Step to;Short stride;Decreased R stance;Improper Weight shift   Gait Assistance 3  Moderate assist   Additional items Assist x 2;Verbal cues   Assistive Device Rolling walker   Distance 8 feet   Ambulation/Elevation Additional Comments Pt presenting with decreased alertness compared to previous session. Pt responsive and able to answer questions, but appearing altered. Post ambulation pt's BP 82/48. RN Misti present and NP Breanne " present and aware. Pt was reclined in recliner chair and instructed to perform ankle pumps.   Balance   Static Sitting Fair   Dynamic Sitting Fair -   Static Standing Poor   Dynamic Standing Poor -   Ambulatory Poor -   Endurance Deficit   Endurance Deficit Yes   Endurance Deficit Description decreased activity tolerance   Activity Tolerance   Activity Tolerance Patient limited by fatigue   Medical Staff Made Aware LEANNA Raymundo   Nurse Made Aware CITLALLI Chappell   Exercises   Knee AROM Long Arc Quad Sitting;10 reps;AROM;Bilateral   Ankle Pumps Sitting;10 reps;AROM;Bilateral   Balance training  sit to stand transfers: 2x   Assessment   Prognosis Good   Problem List Decreased strength;Decreased endurance;Impaired balance;Decreased mobility   Assessment Chart reviewed. Patient was received seated in recliner in [facility] and agreeable to PT session. Today's PT treatment session consisted of therapeutic activity for facilitation of transitional movements and safe performance of correct technique for sit to stand transfers, therapeutic exercise to increase lower extremity muscle strength, and gait training to promote safe and functional ambulation on level surfaces. In comparison to the previous session the patient has not made progress as evident by requiring increased assistance with sit to stand transfers and ambulation. Pt required assist of for all functional mobility. Pt's ambulation was also limited this session. When ambulating pt exhibits decreased right stance time, decreased sd, step to pattern, and increased knee flexion during stance phase. Pt presenting with decreased alertness compared to previous session. Pt responsive and able to answer questions, but appearing altered. Post ambulation pt's BP 82/48. CITLALLI Chappell present and LEANNA Raymundo present and aware. Pt was reclined in recliner chair and instructed to perform ankle pumps. Overall, patient had fair tolerance to today's treatment session. Pt with no functional  progress towards achieving her STG's. Patient's prognosis for achieving her STG's is good. PT intervention continues to be appropriate as the patient continues to be limited by decreased lower extremity strength, impaired balance, decreased endurance, gait deviations, and decreased functional mobility. Continue to recommend level 2, moderate resource intensity. PT to continue to see patient in order to address the deficits listed above and provide interventions consistent with the POC in order to achieve STG's and optimize the patient's independence with functional mobility.   Barriers to Discharge Inaccessible home environment;Decreased caregiver support;Other (Comment)  (decline in functional mobility)   Goals   STG Expiration Date 08/02/25   PT Treatment Day 2   Plan   Treatment/Interventions Functional transfer training;LE strengthening/ROM;Therapeutic exercise;Elevations;Endurance training;Patient/family training;Bed mobility;Gait training;Spoke to nursing;Spoke to advanced practitioner;OT   Progress No functional improvements   PT Frequency 3-5x/wk   Discharge Recommendation   Rehab Resource Intensity Level, PT II (Moderate Resource Intensity)   AM-PAC Basic Mobility Inpatient   Turning in Flat Bed Without Bedrails 2   Lying on Back to Sitting on Edge of Flat Bed Without Bedrails 2   Moving Bed to Chair 1   Standing Up From Chair Using Arms 1   Walk in Room 1   Climb 3-5 Stairs With Railing 1   Basic Mobility Inpatient Raw Score 8   Turning Head Towards Sound 3   Follow Simple Instructions 3   Low Function Basic Mobility Raw Score  14   Low Function Basic Mobility Standardized Score  22.01   [facility] Highest Level Of Mobility   -HLM Goal 3: Sit at edge of bed   -HLM Achieved 5: Stand (1 or more minutes)   Education   Education Provided Mobility training;Home exercise program;Assistive device   Patient Reinforcement needed   End of Consult   Patient Position at End of Consult Bedside chair;Bed/Chair  alarm activated;All needs within reach  (RN present and aware)     Madelaine Saucedo, PT, DPT    Time of PT treatment session: [id number]  28 minutes

## 2025-07-24 NOTE — ASSESSMENT & PLAN NOTE
Patient is slightly more lethargic this morning she is able to follow commands since she was able to get out of bed into the chair this (7/24) morning with nursing staff  She states that she has a headache  And she continues to close her eyes and doze off during exam  Blood pressures were low this morning  We will trial albumin infusion  Head CT was negative  Lethargy has resolved today she is back to her normal mental status likely related to receiving a as needed blood pressure medication that she does not typically use

## 2025-07-24 NOTE — ASSESSMENT & PLAN NOTE
Patient presents with shortness of breath and dyspnea on exertion  No hypoxia  CTA negative for acute PE  Found to have large right-sided pleural effusion  Likely malignant  Pulmonary consulted   Concern for possible metastasis  Will follow-up cytology outpatient  IR consulted for thoracentesis  Completed 7/24 removed 1350 of dark cloudy sherman fluid was aspirated. Specimens obtained/sent as ordered   Afebrile and hemodynamically stable.  No complaints of cough or productive sputum

## 2025-07-25 VITALS
OXYGEN SATURATION: 94 % | SYSTOLIC BLOOD PRESSURE: 124 MMHG | RESPIRATION RATE: 16 BRPM | DIASTOLIC BLOOD PRESSURE: 69 MMHG | WEIGHT: 180.12 LBS | HEART RATE: 73 BPM | BODY MASS INDEX: 28.27 KG/M2 | HEIGHT: 67 IN | TEMPERATURE: 98.5 F

## 2025-07-25 LAB
ANION GAP SERPL CALCULATED.3IONS-SCNC: 6 MMOL/L (ref 4–13)
ATRIAL RATE: 69 BPM
BUN SERPL-MCNC: 32 MG/DL (ref 5–25)
CALCIUM SERPL-MCNC: 7.8 MG/DL (ref 8.4–10.2)
CHLORIDE SERPL-SCNC: 108 MMOL/L (ref 96–108)
CO2 SERPL-SCNC: 26 MMOL/L (ref 21–32)
CREAT SERPL-MCNC: 1.25 MG/DL (ref 0.6–1.3)
ERYTHROCYTE [DISTWIDTH] IN BLOOD BY AUTOMATED COUNT: 19.2 % (ref 11.6–15.1)
GFR SERPL CREATININE-BSD FRML MDRD: 39 ML/MIN/1.73SQ M
GLUCOSE SERPL-MCNC: 96 MG/DL (ref 65–140)
HCT VFR BLD AUTO: 31.4 % (ref 34.8–46.1)
HGB BLD-MCNC: 10.3 G/DL (ref 11.5–15.4)
MAGNESIUM SERPL-MCNC: 2.4 MG/DL (ref 1.9–2.7)
MCH RBC QN AUTO: 24.6 PG (ref 26.8–34.3)
MCHC RBC AUTO-ENTMCNC: 32.8 G/DL (ref 31.4–37.4)
MCV RBC AUTO: 75 FL (ref 82–98)
P AXIS: 102 DEGREES
PLATELET # BLD AUTO: 205 THOUSANDS/UL (ref 149–390)
PMV BLD AUTO: 10.6 FL (ref 8.9–12.7)
POTASSIUM SERPL-SCNC: 3.4 MMOL/L (ref 3.5–5.3)
PR INTERVAL: 216 MS
QRS AXIS: 215 DEGREES
QRSD INTERVAL: 130 MS
QT INTERVAL: 488 MS
QTC INTERVAL: 522 MS
RBC # BLD AUTO: 4.18 MILLION/UL (ref 3.81–5.12)
SODIUM SERPL-SCNC: 140 MMOL/L (ref 135–147)
T WAVE AXIS: 88 DEGREES
VENTRICULAR RATE: 69 BPM
WBC # BLD AUTO: 8.08 THOUSAND/UL (ref 4.31–10.16)

## 2025-07-25 PROCEDURE — 97535 SELF CARE MNGMENT TRAINING: CPT

## 2025-07-25 PROCEDURE — 99239 HOSP IP/OBS DSCHRG MGMT >30: CPT | Performed by: NURSE PRACTITIONER

## 2025-07-25 PROCEDURE — 93010 ELECTROCARDIOGRAM REPORT: CPT | Performed by: INTERNAL MEDICINE

## 2025-07-25 PROCEDURE — 85027 COMPLETE CBC AUTOMATED: CPT | Performed by: NURSE PRACTITIONER

## 2025-07-25 PROCEDURE — 83735 ASSAY OF MAGNESIUM: CPT | Performed by: NURSE PRACTITIONER

## 2025-07-25 PROCEDURE — 80048 BASIC METABOLIC PNL TOTAL CA: CPT | Performed by: NURSE PRACTITIONER

## 2025-07-25 PROCEDURE — 97110 THERAPEUTIC EXERCISES: CPT

## 2025-07-25 RX ADMIN — POTASSIUM CHLORIDE 10 MEQ: 750 TABLET, EXTENDED RELEASE ORAL at 08:29

## 2025-07-25 RX ADMIN — RIVAROXABAN 20 MG: 20 TABLET, FILM COATED ORAL at 08:18

## 2025-07-25 RX ADMIN — DOCUSATE SODIUM 100 MG: 100 CAPSULE, LIQUID FILLED ORAL at 08:18

## 2025-07-25 RX ADMIN — LABETALOL HYDROCHLORIDE 200 MG: 200 TABLET, FILM COATED ORAL at 08:18

## 2025-07-25 RX ADMIN — AMLODIPINE BESYLATE 10 MG: 10 TABLET ORAL at 08:18

## 2025-07-25 RX ADMIN — ATORVASTATIN CALCIUM 40 MG: 40 TABLET, FILM COATED ORAL at 08:18

## 2025-07-25 RX ADMIN — OXYBUTYNIN CHLORIDE 5 MG: 5 TABLET ORAL at 08:19

## 2025-07-25 RX ADMIN — FUROSEMIDE 40 MG: 40 TABLET ORAL at 08:18

## 2025-07-25 NOTE — DISCHARGE SUMMARY
Discharge Summary - Hospitalist   Name: Fanta Bishop 85 y.o. female I MRN: [id number]  Unit/Bed#: -01 I Date of Admission: 7/22/2025   Date of Service: 7/25/2025 I Hospital Day: 3     Assessment & Plan  Pleural effusion  Patient presents with shortness of breath and dyspnea on exertion  No hypoxia  CTA negative for acute PE  Found to have large right-sided pleural effusion  Likely malignant  Pulmonary consulted   Concern for possible metastasis  Will follow-up cytology outpatient  IR consulted for thoracentesis  Completed 7/24 removed 1350 of dark cloudy sherman fluid was aspirated. Specimens obtained/sent as ordered   Afebrile and hemodynamically stable.  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  Tory referral placed for palliative  Lethargic  Patient is slightly more lethargic this morning she is able to follow commands since she was able to get out of bed into the chair this (7/24) morning with nursing staff  She states that she has a headache  And she continues to close her eyes and doze off during exam  Blood pressures were low this morning  We will trial albumin infusion  Head CT was negative  Lethargy has resolved today she is back to her normal mental status likely related to receiving a as needed blood pressure medication that she does not typically use    Discharging Physician / Practitioner: MAITE Lee  PCP: Valerie Villanueva MD  Admission Date:   Admission Orders (From admission, onward)       Ordered        07/22/25 1645  INPATIENT ADMISSION  Once                          Discharge Date: 07/25/25    Medical Problems       Resolved  Problems  Date Reviewed: 7/22/2025   None         Consultations During Hospital Stay:  IP CONSULT TO PULMONOLOGY    Procedures Performed:   CT head wo contrast  Result Date: 7/24/2025  No acute intracranial abnormality. 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 Workstation performed: [id number]     IR IN-Patient Thoracentesis  Result Date: 7/23/2025  Impression: Ultrasound-guided right thoracentesis. Signed, performed, and dictated by MAITE Kaplan under the supervision of Dr. Hooker. Workstation performed: [id number]     CTA chest pe study  Result Date: 7/22/2025  1.  No pulmonary embolism. 2.  Large right pleural effusion with significant right middle lobe and complete right lower lobe passive atelectasis. 3.  Small pericardial effusion. 4.  Known right upper lobe tumor. 5.  Partially imaged possible left hydronephrosis. Computerized Assisted Algorithm (CAA) may have aided analysis of applicable images. Resident: JOSLYN CRAFT I, the attending radiologist, have reviewed the images and agree with the final report above. Workstation performed: [id number]     XR chest 1 view portable  Result Date: 7/22/2025  Large right pleural effusion and right lower lobe atelectasis. Workstation performed: [id number]         Significant Findings / Test Results:   above    Incidental Findings:   no    Test Results Pending at Discharge (will require follow up):   Thoracentesis cytology is pending     Outpatient Tests Requested:      Complications:      Past Medical History[1]    Reason for Admission: Shortness of Breath (Reports she has been feeling SOB for a few days, takes xarelto but ran out and didn't take it for 3 days, hx of DVT )       Hospital Course:     Fanta Bishop is a 85 y.o. female patient with past medical history of  has a past medical history of Anemia, Cardiac disease, Cervical spondylolysis, Cervical stenosis of spine, CHF (congestive heart failure) (HCC), DVT (deep  "venous thrombosis) (HCC), Hypertension, Neuropathy, No blood products, and Osteoarthritis. who originally presented to the hospital on 7/22/2025 due to Shortness of Breath (Reports she has been feeling SOB for a few days, takes xarelto but ran out and didn't take it for 3 days, hx of DVT )  Blood pressure 130/71, pulse 78, temperature 98.5 °F (36.9 °C), resp. rate 14, height 5' 7\" (1.702 m), weight 81.7 kg (180 lb 1.9 oz), SpO2 94%.  Fanta Bishop, an 85-year-old patient, was admitted to the ICU on 7/22/2025 due to shortness of breath, where a large right-sided pleural effusion was identified, leading to significant atelectasis of the right middle and lower lobes [4], [1]. The effusion was suspected to be malignant, and a thoracentesis was performed on 7/24, removing 1350 mL of fluid [2], [1]. During her hospital course, she experienced hypertensive urgency, managed with oral antihypertensives and IV labetalol [3], [2]. Despite mild dyspnea, she participated in physical therapy [3].  Her dyspnea today has resolved and she does appear to be back to normal she feels back to normal she denies any shortness of breath or difficulty breathing she had an episode of hypotension yesterday morning after receiving a as needed blood pressure medication that she typically does not need at home and likely was what contributed to a bit of her abnormalities yesterday        Please see above list of diagnoses and related plan for additional information.     Condition at Discharge: stable     Discharge Day Visit / Exam:     Subjective: Patient is lying in bed resting comfortably denies any chest pain chest tightness shortness of breath or difficulty breathing  Vitals: Blood Pressure: 130/71 (07/25/25 0718)  Pulse: 78 (07/25/25 0718)  Temperature: 98.5 °F (36.9 °C) (07/25/25 0718)  Temp Source: Oral (07/24/25 2025)  Respirations: 14 (07/25/25 0718)  Height: 5' 7\" (170.2 cm) (07/23/25 0233)  Weight - Scale: 81.7 kg (180 lb 1.9 oz) " (07/24/25 2025)  SpO2: 94 % (07/25/25 0718)  Exam:   Physical Exam  Vitals reviewed.   Constitutional:       General: She is not in acute distress.     Appearance: She is ill-appearing.     Cardiovascular:      Rate and Rhythm: Normal rate.   Pulmonary:      Effort: No respiratory distress.   Abdominal:      General: There is no distension.     Skin:     General: Skin is warm and dry.     Neurological:      Mental Status: She is alert. Mental status is at baseline.       Discussion with Family: Rajesh iWlde    Discharge instructions/Information to patient and family:   See after visit summary for information provided to patient and family.      Provisions for Follow-Up Care:  See after visit summary for information related to follow-up care and any pertinent home health orders.      Disposition:     Home    Planned Readmission: no     Discharge Statement:  I spent 45 minutes discharging the patient. This time was spent on the day of discharge. I had direct contact with the patient on the day of discharge. Greater than 50% of the total time was spent examining patient, answering all patient questions, arranging and discussing plan of care with patient as well as directly providing post-discharge instructions.  Additional time then spent on discharge activities.    Discharge Medications:  See after visit summary for reconciled discharge medications provided to patient and family.      ** Please Note: This note has been constructed using a voice recognition system **         [1]   Past Medical History:  Diagnosis Date    Anemia     Cardiac disease     Cervical spondylolysis     Cervical stenosis of spine     CHF (congestive heart failure) ([facility])     DVT (deep venous thrombosis) ([facility])     Hypertension     Neuropathy     from fall accident    No blood products     Osteoarthritis

## 2025-07-25 NOTE — PLAN OF CARE
Problem: OCCUPATIONAL THERAPY ADULT  Goal: Performs self-care activities at highest level of function for planned discharge setting.  See evaluation for individualized goals.  Description: Treatment Interventions: ADL retraining, Functional transfer training, UE strengthening/ROM, Endurance training, Patient/family training, Compensatory technique education, Energy conservation, Activityengagement          See flowsheet documentation for full assessment, interventions and recommendations.   Note: Limitation: Decreased ADL status, Decreased UE strength, Decreased Safe judgement during ADL, Decreased endurance, Decreased self-care trans, Decreased high-level ADLs  Prognosis: Good  Assessment: Patient participated in Skilled OT session this date with interventions consisting of ADL re training with the use of correct body mechnaics and therapeutic exercise to: increase functional use of BUEs, increase BUE muscle strength  . Patient agreeable to OT treatment session, upon arrival patient was found seated OOB to Recliner.  In comparison to previous session, patient with improvements in Toileting, transfers, functional mobility, standing balance, and safety awareness during adls/mobility* . Patient requiring verbal cues for safety and verbal cues for correct technique. Patient continues to be functioning below baseline level, occupational performance remains limited secondary to factors listed above and increased risk for falls and injury.   From OT standpoint, recommendation at time of d/c would be level 2.   Patient to benefit from continued Occupational Therapy treatment while in the hospital to address deficits as defined above and maximize level of functional independence with ADLs and functional mobility.     Rehab Resource Intensity Level, OT: II (Moderate Resource Intensity)     Hung MARTI OTR/L

## 2025-07-25 NOTE — OCCUPATIONAL THERAPY NOTE
Occupational Therapy Treatment Note      Fanta Bishop    7/25/2025    Principal Problem:    Pleural effusion  Active Problems:    History of DVT of lower extremity    Neuropathy    Hypertensive urgency    Lung mass    Lethargic      Past Medical History[1]    Past Surgical History[2]        07/25/25 1523   Note Type   Note Type Treatment   Pain Assessment   Pain Assessment Tool 0-10   Pain Score No Pain   Restrictions/Precautions   Weight Bearing Precautions Per Order No   Braces or Orthoses Other (Comment)   Other Precautions Chair Alarm;Bed Alarm;Fall Risk   ADL   Where Assessed Commode   Toileting Assistance  4  Minimal Assistance  (CGA)   Toileting Deficit Bedside commode;Verbal cueing;Supervison/safety;Increased time to complete;Steadying   Bed Mobility   Additional Comments Pt OOB in recliner start and end of session   Transfers   Sit to Stand 4  Minimal assistance  (CGA)   Additional items Assist x 1;Armrests;Increased time required;Verbal cues   Stand to Sit 4  Minimal assistance  (CGA)   Additional items Assist x 1;Armrests;Increased time required;Verbal cues   Toilet transfer 4  Minimal assistance  (CGA)   Additional items Assist x 1;Commode;Verbal cues;Increased time required   Additional Comments w/RW   Functional Mobility   Functional Mobility 4  Minimal assistance  (CGA)   Additional Comments CGA x 1 w/RW from commode > bathroom > recliner. Verbal cues and increased time required d/t fatigue.   Additional items Rolling walker   Therapeutic Exercise - ROM   UE-ROM Yes   ROM- Right Upper Extremities   R Shoulder AROM;Flexion;Extension;Horizontal ABduction;External rotation;Internal rotation   R Elbow AROM;Elbow flexion;Elbow extension   R Position Seated   R Weight/Reps/Sets None/10/1   ROM - Left Upper Extremities    L Shoulder AROM;Flexion;Extension;Horizontal ABduction;External rotation;Internal rotation   L Elbow AROM;Elbow flexion;Elbow extension   L Position Seated   L Weight/Reps/Sets None/10/1    Cognition   Overall Cognitive Status WFL   Arousal/Participation Arousable;Responsive   Attention Attends with cues to redirect   Orientation Level Oriented X4   Memory Decreased recall of recent events   Following Commands Follows one step commands with increased time or repetition   Assessment   Assessment Patient participated in Skilled OT session this date with interventions consisting of ADL re training with the use of correct body mechnaics and therapeutic exercise to: increase functional use of BUEs, increase BUE muscle strength  . Patient agreeable to OT treatment session, upon arrival patient was found seated OOB to Recliner.  In comparison to previous session, patient with improvements in Toileting, transfers, functional mobility, standing balance, and safety awareness during adls/mobility* . Patient requiring verbal cues for safety and verbal cues for correct technique. Patient continues to be functioning below baseline level, occupational performance remains limited secondary to factors listed above and increased risk for falls and injury.   From OT standpoint, recommendation at time of d/c would be level 2.   Patient to benefit from continued Occupational Therapy treatment while in the hospital to address deficits as defined above and maximize level of functional independence with ADLs and functional mobility.   Plan   Treatment Interventions ADL retraining;Functional transfer training;UE strengthening/ROM;Endurance training;Energy conservation;Activityengagement;Patient/family training   Goal Expiration Date 08/02/25   OT Treatment Day 1   OT Frequency 3-5x/wk   Discharge Recommendation   Rehab Resource Intensity Level, OT II (Moderate Resource Intensity)   AM-PAC Daily Activity Inpatient   Lower Body Dressing 3   Bathing 3   Toileting 3   Upper Body Dressing 3   Grooming 4   Eating 4   Daily Activity Raw Score 20   Daily Activity Standardized Score (Calc for Raw Score >=11) 42.03   AM-PAC Applied  Cognition Inpatient   Following a Speech/Presentation 4   Understanding Ordinary Conversation 4   Taking Medications 3   Remembering Where Things Are Placed or Put Away 3   Remembering List of 4-5 Errands 3   Taking Care of Complicated Tasks 3   Applied Cognition Raw Score 20   Applied Cognition Standardized Score 41.76     Hung MARTI OTR/L       [1]   Past Medical History:  Diagnosis Date    Anemia     Cardiac disease     Cervical spondylolysis     Cervical stenosis of spine     CHF (congestive heart failure) ([facility])     DVT (deep venous thrombosis) ([facility])     Hypertension     Neuropathy     from fall accident    No blood products     Osteoarthritis    [2]   Past Surgical History:  Procedure Laterality Date    BACK SURGERY      CARDIAC PACEMAKER PLACEMENT      IR THORACENTESIS  7/23/2025

## 2025-07-25 NOTE — PLAN OF CARE
Problem: Prexisting or High Potential for Compromised Skin Integrity  Goal: Skin integrity is maintained or improved  Description: INTERVENTIONS:  - Identify patients at risk for skin breakdown  - Assess and monitor skin integrity including under and around medical devices   - Assess and monitor nutrition and hydration status  - Monitor labs  - Assess for incontinence   - Turn and reposition patient  - Assist with mobility/ambulation  - Relieve pressure over michelle prominences   - Avoid friction and shearing  - Provide appropriate hygiene as needed including keeping skin clean and dry  - Evaluate need for skin moisturizer/barrier cream  - Collaborate with interdisciplinary team  - Patient/family teaching  - Consider wound care consult    Assess:  - Review Yan scale daily  - Clean and moisturize skin every shift  - Inspect skin when repositioning, toileting, and assisting with ADLS  - Assess under medical devices such as scd every shift  - Assess extremities for adequate circulation and sensation     Bed Management:  - Have minimal linens on bed & keep smooth, unwrinkled  - Change linens as needed when moist or perspiring  - Avoid sitting or lying in one position for more than 2 hours while in bed?Keep HOB at 30degrees   - Toileting:  - Offer bedside commode  - Assess for incontinence every 2 hours  - Use incontinent care products after each incontinent episode such as wipes    Activity:  - Mobilize patient 3 times a day  - Encourage activity and walks on unit  - Encourage or provide ROM exercises   - Turn and reposition patient every 2 Hours  - Use appropriate equipment to lift or move patient in bed  - Instruct/ Assist with weight shifting every 2 when out of bed in chair  - Consider limitation of chair time 2 hour intervals    Skin Care:  - Avoid use of baby powder, tape, friction and shearing, hot water or constrictive clothing  - Relieve pressure over bony prominences using wedge  - Do not massage red bony  areas    Next Steps:  - Teach patient strategies to minimize risks such as skin break down  - Consider consults to  interdisciplinary teams such as wound care  Outcome: Progressing     Problem: INFECTION - ADULT  Goal: Absence or prevention of progression during hospitalization  Description: INTERVENTIONS:  - Assess and monitor for signs and symptoms of infection  - Monitor lab/diagnostic results  - Monitor all insertion sites, i.e. indwelling lines, tubes, and drains  - Monitor endotracheal if appropriate and nasal secretions for changes in amount and color  - [place] appropriate cooling/warming therapies per order  - Administer medications as ordered  - Instruct and encourage patient and family to use good hand hygiene technique  - Identify and instruct in appropriate isolation precautions for identified infection/condition  Outcome: Progressing     Problem: PAIN - ADULT  Goal: Verbalizes/displays adequate comfort level or baseline comfort level  Description: Interventions:  - Encourage patient to monitor pain and request assistance  - Assess pain using appropriate pain scale  - Administer analgesics as ordered based on type and severity of pain and evaluate response  - Implement non-pharmacological measures as appropriate and evaluate response  - Consider cultural and social influences on pain and pain management  - Notify physician/advanced practitioner if interventions unsuccessful or patient reports new pain  - Educate patient/family on pain management process including their role and importance of  reporting pain   - Provide non-pharmacologic/complimentary pain relief interventions  Outcome: Progressing     Problem: MUSCULOSKELETAL - ADULT  Goal: Maintain or return mobility to safest level of function  Description: INTERVENTIONS:  - Assess patient's ability to carry out ADLs; assess patient's baseline for ADL function and identify physical deficits which impact ability to perform ADLs (bathing, care of  mouth/teeth, toileting, grooming, dressing, etc.)  - Assess/evaluate cause of self-care deficits   - Assess range of motion  - Assess patient's mobility  - Assess patient's need for assistive devices and provide as appropriate  - Encourage maximum independence but intervene and supervise when necessary  - Involve family in performance of ADLs  - Assess for home care needs following discharge   - Consider OT consult to assist with ADL evaluation and planning for discharge  - Provide patient education as appropriate  Outcome: Progressing

## 2025-07-26 LAB
BACTERIA SPEC BFLD CULT: NO GROWTH
GRAM STN SPEC: NORMAL

## 2025-07-28 ENCOUNTER — APPOINTMENT (EMERGENCY)
Dept: RADIOLOGY | Facility: HOSPITAL | Age: 85
DRG: 181 | End: 2025-07-28
Payer: COMMERCIAL

## 2025-07-28 ENCOUNTER — HOSPITAL ENCOUNTER (INPATIENT)
Facility: HOSPITAL | Age: 85
LOS: 7 days | Discharge: NURSING, REHAB OR LONG-TERM CARE | DRG: 181 | End: 2025-08-05
Attending: STUDENT IN AN ORGANIZED HEALTH CARE EDUCATION/TRAINING PROGRAM | Admitting: STUDENT IN AN ORGANIZED HEALTH CARE EDUCATION/TRAINING PROGRAM
Payer: COMMERCIAL

## 2025-07-28 ENCOUNTER — HOSPITAL ENCOUNTER (EMERGENCY)
Facility: HOSPITAL | Age: 85
Discharge: HOME | DRG: 181 | End: 2025-07-28
Attending: EMERGENCY MEDICINE | Admitting: EMERGENCY MEDICINE
Payer: COMMERCIAL

## 2025-07-28 VITALS
TEMPERATURE: 97.9 F | DIASTOLIC BLOOD PRESSURE: 70 MMHG | OXYGEN SATURATION: 93 % | HEART RATE: 77 BPM | SYSTOLIC BLOOD PRESSURE: 133 MMHG | RESPIRATION RATE: 29 BRPM

## 2025-07-28 DIAGNOSIS — I95.9 HYPOTENSION: ICD-10-CM

## 2025-07-28 DIAGNOSIS — R79.89 ELEVATED SERUM CREATININE: ICD-10-CM

## 2025-07-28 DIAGNOSIS — R91.8 LUNG MASS: ICD-10-CM

## 2025-07-28 DIAGNOSIS — R53.1 GENERALIZED WEAKNESS: ICD-10-CM

## 2025-07-28 DIAGNOSIS — N17.9 AKI (ACUTE KIDNEY INJURY) (HCC): ICD-10-CM

## 2025-07-28 DIAGNOSIS — R53.83 FATIGUE: ICD-10-CM

## 2025-07-28 DIAGNOSIS — E86.0 DEHYDRATION: Primary | ICD-10-CM

## 2025-07-28 DIAGNOSIS — J90 PLEURAL EFFUSION: ICD-10-CM

## 2025-07-28 DIAGNOSIS — J91.0 PLEURAL EFFUSION, MALIGNANT: Primary | ICD-10-CM

## 2025-07-28 LAB
ALBUMIN SERPL BCG-MCNC: 3.7 G/DL (ref 3.5–5)
ALP SERPL-CCNC: 69 U/L (ref 34–104)
ALT SERPL W P-5'-P-CCNC: 19 U/L (ref 7–52)
ANION GAP SERPL CALCULATED.3IONS-SCNC: 6 MMOL/L (ref 4–13)
ANION GAP SERPL CALCULATED.3IONS-SCNC: 6 MMOL/L (ref 4–13)
AST SERPL W P-5'-P-CCNC: 22 U/L (ref 13–39)
BASOPHILS # BLD AUTO: 0.03 THOUSANDS/ÂΜL (ref 0–0.1)
BASOPHILS # BLD AUTO: 0.03 THOUSANDS/ÂΜL (ref 0–0.1)
BASOPHILS NFR BLD AUTO: 0 % (ref 0–1)
BASOPHILS NFR BLD AUTO: 0 % (ref 0–1)
BILIRUB SERPL-MCNC: 0.51 MG/DL (ref 0.2–1)
BNP SERPL-MCNC: 163 PG/ML (ref 0–100)
BUN SERPL-MCNC: 36 MG/DL (ref 5–25)
BUN SERPL-MCNC: 36 MG/DL (ref 5–25)
CALCIUM SERPL-MCNC: 9.1 MG/DL (ref 8.4–10.2)
CALCIUM SERPL-MCNC: 9.3 MG/DL (ref 8.4–10.2)
CARDIAC TROPONIN I PNL SERPL HS: 11 NG/L (ref ?–50)
CARDIAC TROPONIN I PNL SERPL HS: 15 NG/L (ref ?–50)
CHLORIDE SERPL-SCNC: 105 MMOL/L (ref 96–108)
CHLORIDE SERPL-SCNC: 105 MMOL/L (ref 96–108)
CO2 SERPL-SCNC: 25 MMOL/L (ref 21–32)
CO2 SERPL-SCNC: 26 MMOL/L (ref 21–32)
CREAT SERPL-MCNC: 1.42 MG/DL (ref 0.6–1.3)
CREAT SERPL-MCNC: 1.47 MG/DL (ref 0.6–1.3)
EOSINOPHIL # BLD AUTO: 0.05 THOUSAND/ÂΜL (ref 0–0.61)
EOSINOPHIL # BLD AUTO: 0.12 THOUSAND/ÂΜL (ref 0–0.61)
EOSINOPHIL NFR BLD AUTO: 1 % (ref 0–6)
EOSINOPHIL NFR BLD AUTO: 1 % (ref 0–6)
ERYTHROCYTE [DISTWIDTH] IN BLOOD BY AUTOMATED COUNT: 19.6 % (ref 11.6–15.1)
ERYTHROCYTE [DISTWIDTH] IN BLOOD BY AUTOMATED COUNT: 19.6 % (ref 11.6–15.1)
FLUAV RNA RESP QL NAA+PROBE: NEGATIVE
FLUBV RNA RESP QL NAA+PROBE: NEGATIVE
GFR SERPL CREATININE-BSD FRML MDRD: 32 ML/MIN/1.73SQ M
GFR SERPL CREATININE-BSD FRML MDRD: 33 ML/MIN/1.73SQ M
GLUCOSE SERPL-MCNC: 102 MG/DL (ref 65–140)
GLUCOSE SERPL-MCNC: 128 MG/DL (ref 65–140)
HCT VFR BLD AUTO: 33 % (ref 34.8–46.1)
HCT VFR BLD AUTO: 33.4 % (ref 34.8–46.1)
HGB BLD-MCNC: 10.6 G/DL (ref 11.5–15.4)
HGB BLD-MCNC: 10.7 G/DL (ref 11.5–15.4)
IMM GRANULOCYTES # BLD AUTO: 0.03 THOUSAND/UL (ref 0–0.2)
IMM GRANULOCYTES # BLD AUTO: 0.05 THOUSAND/UL (ref 0–0.2)
IMM GRANULOCYTES NFR BLD AUTO: 0 % (ref 0–2)
IMM GRANULOCYTES NFR BLD AUTO: 1 % (ref 0–2)
LYMPHOCYTES # BLD AUTO: 0.95 THOUSANDS/ÂΜL (ref 0.6–4.47)
LYMPHOCYTES # BLD AUTO: 0.99 THOUSANDS/ÂΜL (ref 0.6–4.47)
LYMPHOCYTES NFR BLD AUTO: 11 % (ref 14–44)
LYMPHOCYTES NFR BLD AUTO: 11 % (ref 14–44)
MAGNESIUM SERPL-MCNC: 2.6 MG/DL (ref 1.9–2.7)
MCH RBC QN AUTO: 24.6 PG (ref 26.8–34.3)
MCH RBC QN AUTO: 24.7 PG (ref 26.8–34.3)
MCHC RBC AUTO-ENTMCNC: 31.7 G/DL (ref 31.4–37.4)
MCHC RBC AUTO-ENTMCNC: 32.4 G/DL (ref 31.4–37.4)
MCV RBC AUTO: 76 FL (ref 82–98)
MCV RBC AUTO: 78 FL (ref 82–98)
MONOCYTES # BLD AUTO: 0.74 THOUSAND/ÂΜL (ref 0.17–1.22)
MONOCYTES # BLD AUTO: 0.76 THOUSAND/ÂΜL (ref 0.17–1.22)
MONOCYTES NFR BLD AUTO: 8 % (ref 4–12)
MONOCYTES NFR BLD AUTO: 9 % (ref 4–12)
NEUTROPHILS # BLD AUTO: 7.07 THOUSANDS/ÂΜL (ref 1.85–7.62)
NEUTROPHILS # BLD AUTO: 7.14 THOUSANDS/ÂΜL (ref 1.85–7.62)
NEUTS SEG NFR BLD AUTO: 79 % (ref 43–75)
NEUTS SEG NFR BLD AUTO: 79 % (ref 43–75)
NRBC BLD AUTO-RTO: 0 /100 WBCS
NRBC BLD AUTO-RTO: 0 /100 WBCS
PLATELET # BLD AUTO: 212 THOUSANDS/UL (ref 149–390)
PLATELET # BLD AUTO: 250 THOUSANDS/UL (ref 149–390)
PMV BLD AUTO: 11.1 FL (ref 8.9–12.7)
POTASSIUM SERPL-SCNC: 4 MMOL/L (ref 3.5–5.3)
POTASSIUM SERPL-SCNC: 4.4 MMOL/L (ref 3.5–5.3)
PROT SERPL-MCNC: 7 G/DL (ref 6.4–8.4)
RBC # BLD AUTO: 4.31 MILLION/UL (ref 3.81–5.12)
RBC # BLD AUTO: 4.34 MILLION/UL (ref 3.81–5.12)
RSV RNA RESP QL NAA+PROBE: NEGATIVE
SARS-COV-2 RNA RESP QL NAA+PROBE: NEGATIVE
SODIUM SERPL-SCNC: 136 MMOL/L (ref 135–147)
SODIUM SERPL-SCNC: 137 MMOL/L (ref 135–147)
WBC # BLD AUTO: 8.93 THOUSAND/UL (ref 4.31–10.16)
WBC # BLD AUTO: 9.03 THOUSAND/UL (ref 4.31–10.16)

## 2025-07-28 PROCEDURE — 80053 COMPREHEN METABOLIC PANEL: CPT

## 2025-07-28 PROCEDURE — 80048 BASIC METABOLIC PNL TOTAL CA: CPT | Performed by: EMERGENCY MEDICINE

## 2025-07-28 PROCEDURE — 36415 COLL VENOUS BLD VENIPUNCTURE: CPT | Performed by: EMERGENCY MEDICINE

## 2025-07-28 PROCEDURE — 84484 ASSAY OF TROPONIN QUANT: CPT

## 2025-07-28 PROCEDURE — 93005 ELECTROCARDIOGRAM TRACING: CPT

## 2025-07-28 PROCEDURE — 84484 ASSAY OF TROPONIN QUANT: CPT | Performed by: EMERGENCY MEDICINE

## 2025-07-28 PROCEDURE — 85025 COMPLETE CBC W/AUTO DIFF WBC: CPT | Performed by: EMERGENCY MEDICINE

## 2025-07-28 PROCEDURE — 83735 ASSAY OF MAGNESIUM: CPT

## 2025-07-28 PROCEDURE — 85025 COMPLETE CBC W/AUTO DIFF WBC: CPT

## 2025-07-28 PROCEDURE — 99285 EMERGENCY DEPT VISIT HI MDM: CPT

## 2025-07-28 PROCEDURE — 87637 SARSCOV2&INF A&B&RSV AMP PRB: CPT

## 2025-07-28 PROCEDURE — 99285 EMERGENCY DEPT VISIT HI MDM: CPT | Performed by: EMERGENCY MEDICINE

## 2025-07-28 PROCEDURE — 83880 ASSAY OF NATRIURETIC PEPTIDE: CPT

## 2025-07-28 PROCEDURE — 96360 HYDRATION IV INFUSION INIT: CPT

## 2025-07-28 PROCEDURE — 71046 X-RAY EXAM CHEST 2 VIEWS: CPT

## 2025-07-28 RX ADMIN — SODIUM CHLORIDE 1000 ML: 0.9 INJECTION, SOLUTION INTRAVENOUS at 23:50

## 2025-07-28 RX ADMIN — SODIUM CHLORIDE 500 ML: 0.9 INJECTION, SOLUTION INTRAVENOUS at 15:01

## 2025-07-29 ENCOUNTER — APPOINTMENT (INPATIENT)
Dept: CT IMAGING | Facility: HOSPITAL | Age: 85
DRG: 181 | End: 2025-07-29
Payer: COMMERCIAL

## 2025-07-29 ENCOUNTER — TELEPHONE (OUTPATIENT)
Age: 85
End: 2025-07-29

## 2025-07-29 PROBLEM — I50.9 CHF (CONGESTIVE HEART FAILURE) (HCC): Status: ACTIVE | Noted: 2025-07-29

## 2025-07-29 PROBLEM — N17.9 ACUTE KIDNEY INJURY SUPERIMPOSED ON CHRONIC KIDNEY DISEASE  (HCC): Status: ACTIVE | Noted: 2025-07-29

## 2025-07-29 PROBLEM — N18.9 ACUTE KIDNEY INJURY SUPERIMPOSED ON CHRONIC KIDNEY DISEASE  (HCC): Status: ACTIVE | Noted: 2025-07-29

## 2025-07-29 LAB
2HR DELTA HS TROPONIN: 0 NG/L
4HR DELTA HS TROPONIN: 1 NG/L
ANION GAP SERPL CALCULATED.3IONS-SCNC: 4 MMOL/L (ref 4–13)
ATRIAL RATE: 80 BPM
ATRIAL RATE: 80 BPM
BUN SERPL-MCNC: 30 MG/DL (ref 5–25)
CALCIUM SERPL-MCNC: 7.9 MG/DL (ref 8.4–10.2)
CARDIAC TROPONIN I PNL SERPL HS: 11 NG/L (ref ?–50)
CARDIAC TROPONIN I PNL SERPL HS: 12 NG/L (ref ?–50)
CHLORIDE SERPL-SCNC: 112 MMOL/L (ref 96–108)
CO2 SERPL-SCNC: 22 MMOL/L (ref 21–32)
CREAT SERPL-MCNC: 1.17 MG/DL (ref 0.6–1.3)
ERYTHROCYTE [DISTWIDTH] IN BLOOD BY AUTOMATED COUNT: 19.9 % (ref 11.6–15.1)
GFR SERPL CREATININE-BSD FRML MDRD: 42 ML/MIN/1.73SQ M
GLUCOSE P FAST SERPL-MCNC: 102 MG/DL (ref 65–99)
GLUCOSE SERPL-MCNC: 102 MG/DL (ref 65–140)
HCT VFR BLD AUTO: 30.3 % (ref 34.8–46.1)
HGB BLD-MCNC: 9.4 G/DL (ref 11.5–15.4)
MCH RBC QN AUTO: 23.7 PG (ref 26.8–34.3)
MCHC RBC AUTO-ENTMCNC: 31 G/DL (ref 31.4–37.4)
MCV RBC AUTO: 76 FL (ref 82–98)
P AXIS: 104 DEGREES
P AXIS: 87 DEGREES
PLATELET # BLD AUTO: 212 THOUSANDS/UL (ref 149–390)
POTASSIUM SERPL-SCNC: 4.5 MMOL/L (ref 3.5–5.3)
PR INTERVAL: 256 MS
PR INTERVAL: 262 MS
QRS AXIS: -86 DEGREES
QRS AXIS: 257 DEGREES
QRSD INTERVAL: 144 MS
QRSD INTERVAL: 148 MS
QT INTERVAL: 436 MS
QT INTERVAL: 444 MS
QTC INTERVAL: 502 MS
QTC INTERVAL: 512 MS
RBC # BLD AUTO: 3.97 MILLION/UL (ref 3.81–5.12)
SODIUM SERPL-SCNC: 138 MMOL/L (ref 135–147)
T WAVE AXIS: 113 DEGREES
T WAVE AXIS: 121 DEGREES
VENTRICULAR RATE: 80 BPM
VENTRICULAR RATE: 80 BPM
WBC # BLD AUTO: 8.15 THOUSAND/UL (ref 4.31–10.16)

## 2025-07-29 PROCEDURE — 99223 1ST HOSP IP/OBS HIGH 75: CPT | Performed by: STUDENT IN AN ORGANIZED HEALTH CARE EDUCATION/TRAINING PROGRAM

## 2025-07-29 PROCEDURE — 84484 ASSAY OF TROPONIN QUANT: CPT

## 2025-07-29 PROCEDURE — 99205 OFFICE O/P NEW HI 60 MIN: CPT | Performed by: INTERNAL MEDICINE

## 2025-07-29 PROCEDURE — 74177 CT ABD & PELVIS W/CONTRAST: CPT

## 2025-07-29 PROCEDURE — 71250 CT THORAX DX C-: CPT

## 2025-07-29 PROCEDURE — 36415 COLL VENOUS BLD VENIPUNCTURE: CPT

## 2025-07-29 PROCEDURE — 85027 COMPLETE CBC AUTOMATED: CPT

## 2025-07-29 PROCEDURE — 93010 ELECTROCARDIOGRAM REPORT: CPT | Performed by: INTERNAL MEDICINE

## 2025-07-29 PROCEDURE — 80048 BASIC METABOLIC PNL TOTAL CA: CPT

## 2025-07-29 RX ORDER — ATORVASTATIN CALCIUM 40 MG/1
40 TABLET, FILM COATED ORAL DAILY
Status: DISCONTINUED | OUTPATIENT
Start: 2025-07-29 | End: 2025-07-29

## 2025-07-29 RX ORDER — ATORVASTATIN CALCIUM 40 MG/1
40 TABLET, FILM COATED ORAL DAILY
Status: DISCONTINUED | OUTPATIENT
Start: 2025-07-30 | End: 2025-07-31

## 2025-07-29 RX ORDER — LABETALOL 100 MG/1
200 TABLET, FILM COATED ORAL EVERY 12 HOURS SCHEDULED
Status: DISCONTINUED | OUTPATIENT
Start: 2025-07-29 | End: 2025-08-05 | Stop reason: HOSPADM

## 2025-07-29 RX ORDER — SODIUM CHLORIDE 9 MG/ML
100 INJECTION, SOLUTION INTRAVENOUS CONTINUOUS
Status: DISCONTINUED | OUTPATIENT
Start: 2025-07-29 | End: 2025-07-29

## 2025-07-29 RX ORDER — AMLODIPINE BESYLATE 5 MG/1
5 TABLET ORAL DAILY
Status: DISCONTINUED | OUTPATIENT
Start: 2025-07-29 | End: 2025-07-31

## 2025-07-29 RX ORDER — FUROSEMIDE 40 MG/1
40 TABLET ORAL EVERY OTHER DAY
Status: DISCONTINUED | OUTPATIENT
Start: 2025-07-29 | End: 2025-08-05 | Stop reason: HOSPADM

## 2025-07-29 RX ORDER — FAMOTIDINE 20 MG/1
20 TABLET, FILM COATED ORAL DAILY
Status: DISCONTINUED | OUTPATIENT
Start: 2025-07-29 | End: 2025-08-05 | Stop reason: HOSPADM

## 2025-07-29 RX ORDER — AMLODIPINE BESYLATE 5 MG/1
5 TABLET ORAL DAILY
Status: DISCONTINUED | OUTPATIENT
Start: 2025-07-29 | End: 2025-07-29

## 2025-07-29 RX ORDER — LABETALOL 100 MG/1
200 TABLET, FILM COATED ORAL EVERY 12 HOURS SCHEDULED
Status: DISCONTINUED | OUTPATIENT
Start: 2025-07-29 | End: 2025-07-29

## 2025-07-29 RX ORDER — ACETAMINOPHEN 325 MG/1
650 TABLET ORAL EVERY 6 HOURS PRN
Status: DISCONTINUED | OUTPATIENT
Start: 2025-07-29 | End: 2025-08-05 | Stop reason: HOSPADM

## 2025-07-29 RX ADMIN — FAMOTIDINE 20 MG: 20 TABLET, FILM COATED ORAL at 09:19

## 2025-07-29 RX ADMIN — RIVAROXABAN 15 MG: 15 TABLET, FILM COATED ORAL at 09:19

## 2025-07-29 RX ADMIN — ACETAMINOPHEN 650 MG: 325 TABLET ORAL at 22:42

## 2025-07-29 RX ADMIN — SODIUM CHLORIDE 100 ML/HR: 0.9 INJECTION, SOLUTION INTRAVENOUS at 02:40

## 2025-07-29 RX ADMIN — AMLODIPINE BESYLATE 5 MG: 5 TABLET ORAL at 16:14

## 2025-07-29 RX ADMIN — IOHEXOL 100 ML: 350 INJECTION, SOLUTION INTRAVENOUS at 20:57

## 2025-07-29 RX ADMIN — LABETALOL HYDROCHLORIDE 200 MG: 100 TABLET, FILM COATED ORAL at 16:14

## 2025-07-30 LAB
ANION GAP SERPL CALCULATED.3IONS-SCNC: 7 MMOL/L (ref 4–13)
ATRIAL RATE: 68 BPM
BUN SERPL-MCNC: 17 MG/DL (ref 5–25)
CALCIUM SERPL-MCNC: 9 MG/DL (ref 8.4–10.2)
CHLORIDE SERPL-SCNC: 110 MMOL/L (ref 96–108)
CO2 SERPL-SCNC: 23 MMOL/L (ref 21–32)
CREAT SERPL-MCNC: 1 MG/DL (ref 0.6–1.3)
ERYTHROCYTE [DISTWIDTH] IN BLOOD BY AUTOMATED COUNT: 19.7 % (ref 11.6–15.1)
GFR SERPL CREATININE-BSD FRML MDRD: 51 ML/MIN/1.73SQ M
GLUCOSE SERPL-MCNC: 112 MG/DL (ref 65–140)
HCT VFR BLD AUTO: 34.9 % (ref 34.8–46.1)
HGB BLD-MCNC: 11 G/DL (ref 11.5–15.4)
MCH RBC QN AUTO: 24 PG (ref 26.8–34.3)
MCHC RBC AUTO-ENTMCNC: 31.5 G/DL (ref 31.4–37.4)
MCV RBC AUTO: 76 FL (ref 82–98)
P AXIS: 41 DEGREES
PLATELET # BLD AUTO: 260 THOUSANDS/UL (ref 149–390)
PMV BLD AUTO: 10.7 FL (ref 8.9–12.7)
POTASSIUM SERPL-SCNC: 4.5 MMOL/L (ref 3.5–5.3)
PR INTERVAL: 284 MS
QRS AXIS: -77 DEGREES
QRSD INTERVAL: 140 MS
QT INTERVAL: 454 MS
QTC INTERVAL: 482 MS
RBC # BLD AUTO: 4.59 MILLION/UL (ref 3.81–5.12)
SODIUM SERPL-SCNC: 140 MMOL/L (ref 135–147)
T WAVE AXIS: 93 DEGREES
VENTRICULAR RATE: 68 BPM
WBC # BLD AUTO: 7.43 THOUSAND/UL (ref 4.31–10.16)

## 2025-07-30 PROCEDURE — 99233 SBSQ HOSP IP/OBS HIGH 50: CPT | Performed by: STUDENT IN AN ORGANIZED HEALTH CARE EDUCATION/TRAINING PROGRAM

## 2025-07-30 PROCEDURE — 88342 IMHCHEM/IMCYTCHM 1ST ANTB: CPT | Performed by: PATHOLOGY

## 2025-07-30 PROCEDURE — 88112 CYTOPATH CELL ENHANCE TECH: CPT | Performed by: PATHOLOGY

## 2025-07-30 PROCEDURE — 88305 TISSUE EXAM BY PATHOLOGIST: CPT | Performed by: PATHOLOGY

## 2025-07-30 PROCEDURE — 80048 BASIC METABOLIC PNL TOTAL CA: CPT | Performed by: STUDENT IN AN ORGANIZED HEALTH CARE EDUCATION/TRAINING PROGRAM

## 2025-07-30 PROCEDURE — 88341 IMHCHEM/IMCYTCHM EA ADD ANTB: CPT | Performed by: PATHOLOGY

## 2025-07-30 PROCEDURE — 93010 ELECTROCARDIOGRAM REPORT: CPT | Performed by: STUDENT IN AN ORGANIZED HEALTH CARE EDUCATION/TRAINING PROGRAM

## 2025-07-30 PROCEDURE — 85027 COMPLETE CBC AUTOMATED: CPT | Performed by: STUDENT IN AN ORGANIZED HEALTH CARE EDUCATION/TRAINING PROGRAM

## 2025-07-30 RX ADMIN — AMLODIPINE BESYLATE 5 MG: 5 TABLET ORAL at 08:02

## 2025-07-30 RX ADMIN — LABETALOL HYDROCHLORIDE 200 MG: 100 TABLET, FILM COATED ORAL at 08:02

## 2025-07-30 RX ADMIN — RIVAROXABAN 15 MG: 15 TABLET, FILM COATED ORAL at 08:02

## 2025-07-30 RX ADMIN — LABETALOL HYDROCHLORIDE 200 MG: 100 TABLET, FILM COATED ORAL at 22:05

## 2025-07-30 RX ADMIN — FAMOTIDINE 20 MG: 20 TABLET, FILM COATED ORAL at 08:02

## 2025-07-30 RX ADMIN — ACETAMINOPHEN 650 MG: 325 TABLET ORAL at 22:05

## 2025-07-30 RX ADMIN — ATORVASTATIN CALCIUM 40 MG: 40 TABLET, FILM COATED ORAL at 08:02

## 2025-07-31 ENCOUNTER — APPOINTMENT (INPATIENT)
Dept: RADIOLOGY | Facility: HOSPITAL | Age: 85
DRG: 181 | End: 2025-07-31
Payer: COMMERCIAL

## 2025-07-31 PROBLEM — Z51.5 PALLIATIVE CARE ENCOUNTER: Status: ACTIVE | Noted: 2025-07-31

## 2025-07-31 PROBLEM — Z71.89 GOALS OF CARE, COUNSELING/DISCUSSION: Status: ACTIVE | Noted: 2025-07-31

## 2025-07-31 LAB
ALBUMIN SERPL BCG-MCNC: 3.4 G/DL (ref 3.5–5)
ALP SERPL-CCNC: 65 U/L (ref 34–104)
ALT SERPL W P-5'-P-CCNC: 14 U/L (ref 7–52)
ANION GAP SERPL CALCULATED.3IONS-SCNC: 7 MMOL/L (ref 4–13)
AST SERPL W P-5'-P-CCNC: 11 U/L (ref 13–39)
BASOPHILS # BLD AUTO: 0.03 THOUSANDS/ÂΜL (ref 0–0.1)
BASOPHILS NFR BLD AUTO: 0 % (ref 0–1)
BILIRUB SERPL-MCNC: 0.58 MG/DL (ref 0.2–1)
BUN SERPL-MCNC: 13 MG/DL (ref 5–25)
CALCIUM ALBUM COR SERPL-MCNC: 9 MG/DL (ref 8.3–10.1)
CALCIUM SERPL-MCNC: 8.5 MG/DL (ref 8.4–10.2)
CHLORIDE SERPL-SCNC: 109 MMOL/L (ref 96–108)
CO2 SERPL-SCNC: 22 MMOL/L (ref 21–32)
CREAT SERPL-MCNC: 0.84 MG/DL (ref 0.6–1.3)
EOSINOPHIL # BLD AUTO: 0.23 THOUSAND/ÂΜL (ref 0–0.61)
EOSINOPHIL NFR BLD AUTO: 3 % (ref 0–6)
ERYTHROCYTE [DISTWIDTH] IN BLOOD BY AUTOMATED COUNT: 19.8 % (ref 11.6–15.1)
GFR SERPL CREATININE-BSD FRML MDRD: 63 ML/MIN/1.73SQ M
GLUCOSE SERPL-MCNC: 112 MG/DL (ref 65–140)
HCT VFR BLD AUTO: 32.3 % (ref 34.8–46.1)
HGB BLD-MCNC: 9.9 G/DL (ref 11.5–15.4)
IMM GRANULOCYTES # BLD AUTO: 0.02 THOUSAND/UL (ref 0–0.2)
IMM GRANULOCYTES NFR BLD AUTO: 0 % (ref 0–2)
LYMPHOCYTES # BLD AUTO: 1.28 THOUSANDS/ÂΜL (ref 0.6–4.47)
LYMPHOCYTES NFR BLD AUTO: 18 % (ref 14–44)
MCH RBC QN AUTO: 23.2 PG (ref 26.8–34.3)
MCHC RBC AUTO-ENTMCNC: 30.7 G/DL (ref 31.4–37.4)
MCV RBC AUTO: 76 FL (ref 82–98)
MONOCYTES # BLD AUTO: 0.45 THOUSAND/ÂΜL (ref 0.17–1.22)
MONOCYTES NFR BLD AUTO: 6 % (ref 4–12)
NEUTROPHILS # BLD AUTO: 4.97 THOUSANDS/ÂΜL (ref 1.85–7.62)
NEUTS SEG NFR BLD AUTO: 73 % (ref 43–75)
NRBC BLD AUTO-RTO: 0 /100 WBCS
PLATELET # BLD AUTO: 237 THOUSANDS/UL (ref 149–390)
PMV BLD AUTO: 10.4 FL (ref 8.9–12.7)
POTASSIUM SERPL-SCNC: 3.9 MMOL/L (ref 3.5–5.3)
PROT SERPL-MCNC: 6.3 G/DL (ref 6.4–8.4)
RBC # BLD AUTO: 4.26 MILLION/UL (ref 3.81–5.12)
SODIUM SERPL-SCNC: 138 MMOL/L (ref 135–147)
WBC # BLD AUTO: 6.98 THOUSAND/UL (ref 4.31–10.16)

## 2025-07-31 PROCEDURE — 85025 COMPLETE CBC W/AUTO DIFF WBC: CPT

## 2025-07-31 PROCEDURE — 80053 COMPREHEN METABOLIC PANEL: CPT

## 2025-07-31 PROCEDURE — 99223 1ST HOSP IP/OBS HIGH 75: CPT | Performed by: NURSE PRACTITIONER

## 2025-07-31 PROCEDURE — 71045 X-RAY EXAM CHEST 1 VIEW: CPT

## 2025-07-31 PROCEDURE — 99233 SBSQ HOSP IP/OBS HIGH 50: CPT | Performed by: STUDENT IN AN ORGANIZED HEALTH CARE EDUCATION/TRAINING PROGRAM

## 2025-07-31 RX ORDER — ATORVASTATIN CALCIUM 40 MG/1
40 TABLET, FILM COATED ORAL DAILY
Status: DISCONTINUED | OUTPATIENT
Start: 2025-08-01 | End: 2025-08-05 | Stop reason: HOSPADM

## 2025-07-31 RX ORDER — AMLODIPINE BESYLATE 10 MG/1
10 TABLET ORAL DAILY
Status: DISCONTINUED | OUTPATIENT
Start: 2025-08-01 | End: 2025-08-05 | Stop reason: HOSPADM

## 2025-07-31 RX ORDER — POLYETHYLENE GLYCOL 3350 17 G/17G
17 POWDER, FOR SOLUTION ORAL DAILY
Status: DISCONTINUED | OUTPATIENT
Start: 2025-07-31 | End: 2025-08-05 | Stop reason: HOSPADM

## 2025-07-31 RX ADMIN — FUROSEMIDE 40 MG: 40 TABLET ORAL at 08:01

## 2025-07-31 RX ADMIN — LABETALOL HYDROCHLORIDE 200 MG: 100 TABLET, FILM COATED ORAL at 21:32

## 2025-07-31 RX ADMIN — FAMOTIDINE 20 MG: 20 TABLET, FILM COATED ORAL at 08:01

## 2025-07-31 RX ADMIN — AMLODIPINE BESYLATE 5 MG: 5 TABLET ORAL at 08:01

## 2025-07-31 RX ADMIN — POLYETHYLENE GLYCOL 3350 17 G: 17 POWDER, FOR SOLUTION ORAL at 12:33

## 2025-07-31 RX ADMIN — ATORVASTATIN CALCIUM 40 MG: 40 TABLET, FILM COATED ORAL at 08:01

## 2025-07-31 RX ADMIN — LABETALOL HYDROCHLORIDE 200 MG: 100 TABLET, FILM COATED ORAL at 08:01

## 2025-07-31 RX ADMIN — RIVAROXABAN 15 MG: 15 TABLET, FILM COATED ORAL at 08:01

## 2025-08-01 ENCOUNTER — APPOINTMENT (INPATIENT)
Dept: NON INVASIVE DIAGNOSTICS | Facility: HOSPITAL | Age: 85
DRG: 181 | End: 2025-08-01
Attending: STUDENT IN AN ORGANIZED HEALTH CARE EDUCATION/TRAINING PROGRAM
Payer: COMMERCIAL

## 2025-08-01 ENCOUNTER — APPOINTMENT (INPATIENT)
Dept: RADIOLOGY | Facility: HOSPITAL | Age: 85
DRG: 181 | End: 2025-08-01
Payer: COMMERCIAL

## 2025-08-01 PROCEDURE — 99232 SBSQ HOSP IP/OBS MODERATE 35: CPT | Performed by: NURSE PRACTITIONER

## 2025-08-01 PROCEDURE — 0W993ZZ DRAINAGE OF RIGHT PLEURAL CAVITY, PERCUTANEOUS APPROACH: ICD-10-PCS | Performed by: RADIOLOGY

## 2025-08-01 PROCEDURE — 99233 SBSQ HOSP IP/OBS HIGH 50: CPT | Performed by: STUDENT IN AN ORGANIZED HEALTH CARE EDUCATION/TRAINING PROGRAM

## 2025-08-01 PROCEDURE — 32555 ASPIRATE PLEURA W/ IMAGING: CPT | Performed by: RADIOLOGY

## 2025-08-01 PROCEDURE — 71045 X-RAY EXAM CHEST 1 VIEW: CPT

## 2025-08-01 RX ORDER — LIDOCAINE WITH 8.4% SOD BICARB 0.9%(10ML)
SYRINGE (ML) INJECTION AS NEEDED
Status: COMPLETED | OUTPATIENT
Start: 2025-08-01 | End: 2025-08-01

## 2025-08-01 RX ADMIN — Medication 10 ML: at 10:17

## 2025-08-01 RX ADMIN — RIVAROXABAN 15 MG: 15 TABLET, FILM COATED ORAL at 08:08

## 2025-08-01 RX ADMIN — ACETAMINOPHEN 650 MG: 325 TABLET ORAL at 19:27

## 2025-08-01 RX ADMIN — LABETALOL HYDROCHLORIDE 200 MG: 100 TABLET, FILM COATED ORAL at 08:08

## 2025-08-01 RX ADMIN — FAMOTIDINE 20 MG: 20 TABLET, FILM COATED ORAL at 08:09

## 2025-08-01 RX ADMIN — AMLODIPINE BESYLATE 10 MG: 10 TABLET ORAL at 08:08

## 2025-08-01 RX ADMIN — LABETALOL HYDROCHLORIDE 200 MG: 100 TABLET, FILM COATED ORAL at 21:42

## 2025-08-01 RX ADMIN — POLYETHYLENE GLYCOL 3350 17 G: 17 POWDER, FOR SOLUTION ORAL at 08:09

## 2025-08-01 RX ADMIN — ATORVASTATIN CALCIUM 40 MG: 40 TABLET, FILM COATED ORAL at 08:08

## 2025-08-02 ENCOUNTER — APPOINTMENT (INPATIENT)
Dept: RADIOLOGY | Facility: HOSPITAL | Age: 85
DRG: 181 | End: 2025-08-02
Payer: COMMERCIAL

## 2025-08-02 PROCEDURE — NC001 PR NO CHARGE: Performed by: STUDENT IN AN ORGANIZED HEALTH CARE EDUCATION/TRAINING PROGRAM

## 2025-08-02 PROCEDURE — 99233 SBSQ HOSP IP/OBS HIGH 50: CPT | Performed by: STUDENT IN AN ORGANIZED HEALTH CARE EDUCATION/TRAINING PROGRAM

## 2025-08-02 PROCEDURE — 97163 PT EVAL HIGH COMPLEX 45 MIN: CPT

## 2025-08-02 PROCEDURE — 71045 X-RAY EXAM CHEST 1 VIEW: CPT

## 2025-08-02 RX ADMIN — RIVAROXABAN 15 MG: 15 TABLET, FILM COATED ORAL at 07:38

## 2025-08-02 RX ADMIN — LABETALOL HYDROCHLORIDE 200 MG: 100 TABLET, FILM COATED ORAL at 21:47

## 2025-08-02 RX ADMIN — FUROSEMIDE 40 MG: 40 TABLET ORAL at 09:39

## 2025-08-02 RX ADMIN — LABETALOL HYDROCHLORIDE 200 MG: 100 TABLET, FILM COATED ORAL at 09:40

## 2025-08-02 RX ADMIN — FAMOTIDINE 20 MG: 20 TABLET, FILM COATED ORAL at 09:39

## 2025-08-02 RX ADMIN — ATORVASTATIN CALCIUM 40 MG: 40 TABLET, FILM COATED ORAL at 09:39

## 2025-08-02 RX ADMIN — AMLODIPINE BESYLATE 10 MG: 10 TABLET ORAL at 09:39

## 2025-08-03 LAB
ANION GAP SERPL CALCULATED.3IONS-SCNC: 6 MMOL/L (ref 4–13)
BUN SERPL-MCNC: 17 MG/DL (ref 5–25)
CALCIUM SERPL-MCNC: 8.4 MG/DL (ref 8.4–10.2)
CHLORIDE SERPL-SCNC: 108 MMOL/L (ref 96–108)
CO2 SERPL-SCNC: 24 MMOL/L (ref 21–32)
CREAT SERPL-MCNC: 1 MG/DL (ref 0.6–1.3)
ERYTHROCYTE [DISTWIDTH] IN BLOOD BY AUTOMATED COUNT: 19.2 % (ref 11.6–15.1)
GFR SERPL CREATININE-BSD FRML MDRD: 51 ML/MIN/1.73SQ M
GLUCOSE SERPL-MCNC: 111 MG/DL (ref 65–140)
HCT VFR BLD AUTO: 31.3 % (ref 34.8–46.1)
HGB BLD-MCNC: 10.2 G/DL (ref 11.5–15.4)
MCH RBC QN AUTO: 24.5 PG (ref 26.8–34.3)
MCHC RBC AUTO-ENTMCNC: 32.6 G/DL (ref 31.4–37.4)
MCV RBC AUTO: 75 FL (ref 82–98)
PLATELET # BLD AUTO: 235 THOUSANDS/UL (ref 149–390)
PMV BLD AUTO: 10.2 FL (ref 8.9–12.7)
POTASSIUM SERPL-SCNC: 4.2 MMOL/L (ref 3.5–5.3)
RBC # BLD AUTO: 4.16 MILLION/UL (ref 3.81–5.12)
SODIUM SERPL-SCNC: 138 MMOL/L (ref 135–147)
WBC # BLD AUTO: 8.63 THOUSAND/UL (ref 4.31–10.16)

## 2025-08-03 PROCEDURE — 85027 COMPLETE CBC AUTOMATED: CPT | Performed by: STUDENT IN AN ORGANIZED HEALTH CARE EDUCATION/TRAINING PROGRAM

## 2025-08-03 PROCEDURE — 97116 GAIT TRAINING THERAPY: CPT

## 2025-08-03 PROCEDURE — 99233 SBSQ HOSP IP/OBS HIGH 50: CPT | Performed by: STUDENT IN AN ORGANIZED HEALTH CARE EDUCATION/TRAINING PROGRAM

## 2025-08-03 PROCEDURE — 80048 BASIC METABOLIC PNL TOTAL CA: CPT | Performed by: STUDENT IN AN ORGANIZED HEALTH CARE EDUCATION/TRAINING PROGRAM

## 2025-08-03 RX ADMIN — AMLODIPINE BESYLATE 10 MG: 10 TABLET ORAL at 08:26

## 2025-08-03 RX ADMIN — LABETALOL HYDROCHLORIDE 200 MG: 100 TABLET, FILM COATED ORAL at 08:26

## 2025-08-03 RX ADMIN — LABETALOL HYDROCHLORIDE 200 MG: 100 TABLET, FILM COATED ORAL at 20:08

## 2025-08-03 RX ADMIN — FAMOTIDINE 20 MG: 20 TABLET, FILM COATED ORAL at 08:27

## 2025-08-03 RX ADMIN — RIVAROXABAN 15 MG: 15 TABLET, FILM COATED ORAL at 08:27

## 2025-08-03 RX ADMIN — ATORVASTATIN CALCIUM 40 MG: 40 TABLET, FILM COATED ORAL at 08:26

## 2025-08-04 ENCOUNTER — APPOINTMENT (INPATIENT)
Dept: RADIOLOGY | Facility: HOSPITAL | Age: 85
DRG: 181 | End: 2025-08-04
Payer: COMMERCIAL

## 2025-08-04 ENCOUNTER — APPOINTMENT (INPATIENT)
Dept: NON INVASIVE DIAGNOSTICS | Facility: HOSPITAL | Age: 85
DRG: 181 | End: 2025-08-04
Attending: STUDENT IN AN ORGANIZED HEALTH CARE EDUCATION/TRAINING PROGRAM
Payer: COMMERCIAL

## 2025-08-04 PROCEDURE — C1729 CATH, DRAINAGE: HCPCS

## 2025-08-04 PROCEDURE — 32550 INSERT PLEURAL CATH: CPT

## 2025-08-04 PROCEDURE — 99233 SBSQ HOSP IP/OBS HIGH 50: CPT | Performed by: STUDENT IN AN ORGANIZED HEALTH CARE EDUCATION/TRAINING PROGRAM

## 2025-08-04 PROCEDURE — 97535 SELF CARE MNGMENT TRAINING: CPT

## 2025-08-04 PROCEDURE — 99153 MOD SED SAME PHYS/QHP EA: CPT

## 2025-08-04 PROCEDURE — 71045 X-RAY EXAM CHEST 1 VIEW: CPT

## 2025-08-04 PROCEDURE — 75989 ABSCESS DRAINAGE UNDER X-RAY: CPT

## 2025-08-04 PROCEDURE — 32550 INSERT PLEURAL CATH: CPT | Performed by: RADIOLOGY

## 2025-08-04 PROCEDURE — 99152 MOD SED SAME PHYS/QHP 5/>YRS: CPT

## 2025-08-04 PROCEDURE — 97167 OT EVAL HIGH COMPLEX 60 MIN: CPT

## 2025-08-04 RX ORDER — MIDAZOLAM HYDROCHLORIDE 2 MG/2ML
INJECTION, SOLUTION INTRAMUSCULAR; INTRAVENOUS AS NEEDED
Status: COMPLETED | OUTPATIENT
Start: 2025-08-04 | End: 2025-08-04

## 2025-08-04 RX ORDER — FENTANYL CITRATE 50 UG/ML
INJECTION, SOLUTION INTRAMUSCULAR; INTRAVENOUS AS NEEDED
Status: COMPLETED | OUTPATIENT
Start: 2025-08-04 | End: 2025-08-04

## 2025-08-04 RX ADMIN — FENTANYL CITRATE 12.5 MCG: 50 INJECTION, SOLUTION INTRAMUSCULAR; INTRAVENOUS at 09:35

## 2025-08-04 RX ADMIN — Medication 0.25 ML: at 09:45

## 2025-08-04 RX ADMIN — MIDAZOLAM HYDROCHLORIDE 0.25 MG: 1 INJECTION, SOLUTION INTRAMUSCULAR; INTRAVENOUS at 09:48

## 2025-08-04 RX ADMIN — RIVAROXABAN 15 MG: 15 TABLET, FILM COATED ORAL at 08:08

## 2025-08-04 RX ADMIN — FAMOTIDINE 20 MG: 20 TABLET, FILM COATED ORAL at 08:08

## 2025-08-04 RX ADMIN — FENTANYL CITRATE 12.5 MCG: 50 INJECTION, SOLUTION INTRAMUSCULAR; INTRAVENOUS at 09:47

## 2025-08-04 RX ADMIN — Medication 15 ML: at 09:50

## 2025-08-04 RX ADMIN — LABETALOL HYDROCHLORIDE 200 MG: 100 TABLET, FILM COATED ORAL at 20:42

## 2025-08-04 RX ADMIN — AMLODIPINE BESYLATE 10 MG: 10 TABLET ORAL at 08:08

## 2025-08-04 RX ADMIN — LABETALOL HYDROCHLORIDE 200 MG: 100 TABLET, FILM COATED ORAL at 08:08

## 2025-08-04 RX ADMIN — MIDAZOLAM HYDROCHLORIDE 0.25 MG: 1 INJECTION, SOLUTION INTRAMUSCULAR; INTRAVENOUS at 09:54

## 2025-08-04 RX ADMIN — ACETAMINOPHEN 650 MG: 325 TABLET ORAL at 20:42

## 2025-08-04 RX ADMIN — Medication 20 ML: at 09:48

## 2025-08-04 RX ADMIN — FUROSEMIDE 40 MG: 40 TABLET ORAL at 08:08

## 2025-08-04 RX ADMIN — ATORVASTATIN CALCIUM 40 MG: 40 TABLET, FILM COATED ORAL at 08:08

## 2025-08-05 VITALS
SYSTOLIC BLOOD PRESSURE: 133 MMHG | BODY MASS INDEX: 29.17 KG/M2 | DIASTOLIC BLOOD PRESSURE: 62 MMHG | OXYGEN SATURATION: 97 % | WEIGHT: 185.85 LBS | TEMPERATURE: 98.8 F | RESPIRATION RATE: 16 BRPM | HEART RATE: 63 BPM | HEIGHT: 67 IN

## 2025-08-05 PROCEDURE — 99239 HOSP IP/OBS DSCHRG MGMT >30: CPT | Performed by: STUDENT IN AN ORGANIZED HEALTH CARE EDUCATION/TRAINING PROGRAM

## 2025-08-05 RX ADMIN — RIVAROXABAN 15 MG: 15 TABLET, FILM COATED ORAL at 08:26

## 2025-08-05 RX ADMIN — ACETAMINOPHEN 650 MG: 325 TABLET ORAL at 08:36

## 2025-08-05 RX ADMIN — LABETALOL HYDROCHLORIDE 200 MG: 100 TABLET, FILM COATED ORAL at 08:26

## 2025-08-05 RX ADMIN — FAMOTIDINE 20 MG: 20 TABLET, FILM COATED ORAL at 08:26

## 2025-08-05 RX ADMIN — AMLODIPINE BESYLATE 10 MG: 10 TABLET ORAL at 08:26

## 2025-08-05 RX ADMIN — ATORVASTATIN CALCIUM 40 MG: 40 TABLET, FILM COATED ORAL at 08:26

## 2025-08-06 ENCOUNTER — PREP FOR PROCEDURE (OUTPATIENT)
Dept: INTERVENTIONAL RADIOLOGY/VASCULAR | Facility: CLINIC | Age: 85
End: 2025-08-06

## 2025-08-06 ENCOUNTER — TELEPHONE (OUTPATIENT)
Dept: PALLIATIVE MEDICINE | Facility: CLINIC | Age: 85
End: 2025-08-06

## 2025-08-06 ENCOUNTER — TELEPHONE (OUTPATIENT)
Age: 85
End: 2025-08-06

## 2025-08-06 DIAGNOSIS — J90 PLEURAL EFFUSION: Primary | ICD-10-CM

## 2025-08-07 ENCOUNTER — TELEPHONE (OUTPATIENT)
Dept: RADIOLOGY | Facility: HOSPITAL | Age: 85
End: 2025-08-07